# Patient Record
Sex: MALE | Race: WHITE | Employment: FULL TIME | RURAL
[De-identification: names, ages, dates, MRNs, and addresses within clinical notes are randomized per-mention and may not be internally consistent; named-entity substitution may affect disease eponyms.]

---

## 2017-01-07 ENCOUNTER — HOSPITAL ENCOUNTER (OUTPATIENT)
Dept: OTHER | Age: 47
Discharge: HOME | End: 2017-01-07
Attending: NURSE PRACTITIONER | Admitting: NURSE PRACTITIONER

## 2017-01-07 DIAGNOSIS — M54.5 LOW BACK PAIN, UNSPECIFIED BACK PAIN LATERALITY, UNSPECIFIED CHRONICITY, WITH SCIATICA PRESENCE UNSPECIFIED: ICD-10-CM

## 2017-01-24 ENCOUNTER — OFFICE VISIT (OUTPATIENT)
Dept: PRIMARY CARE CLINIC | Age: 47
End: 2017-01-24
Payer: COMMERCIAL

## 2017-01-24 VITALS
OXYGEN SATURATION: 98 % | WEIGHT: 230 LBS | RESPIRATION RATE: 20 BRPM | DIASTOLIC BLOOD PRESSURE: 86 MMHG | SYSTOLIC BLOOD PRESSURE: 130 MMHG | BODY MASS INDEX: 31.15 KG/M2 | HEART RATE: 90 BPM | HEIGHT: 72 IN

## 2017-01-24 DIAGNOSIS — M54.50 CHRONIC LEFT-SIDED LOW BACK PAIN WITHOUT SCIATICA: ICD-10-CM

## 2017-01-24 DIAGNOSIS — R10.32 LEFT GROIN PAIN: ICD-10-CM

## 2017-01-24 DIAGNOSIS — G89.29 CHRONIC LEFT-SIDED LOW BACK PAIN WITHOUT SCIATICA: ICD-10-CM

## 2017-01-24 PROCEDURE — 99202 OFFICE O/P NEW SF 15 MIN: CPT | Performed by: FAMILY MEDICINE

## 2017-01-24 RX ORDER — HYDROCODONE BITARTRATE AND ACETAMINOPHEN 7.5; 325 MG/1; MG/1
7.5-325 TABLET ORAL EVERY 6 HOURS PRN
Refills: 0 | COMMUNITY
Start: 2017-01-06 | End: 2017-01-24

## 2017-01-24 RX ORDER — TIZANIDINE 4 MG/1
4 TABLET ORAL NIGHTLY PRN
Refills: 0 | COMMUNITY
Start: 2017-01-06 | End: 2017-01-24

## 2017-01-24 RX ORDER — LOSARTAN POTASSIUM 100 MG/1
100 TABLET ORAL DAILY
Refills: 0 | COMMUNITY
Start: 2016-12-24

## 2017-01-24 RX ORDER — HYDROCHLOROTHIAZIDE 12.5 MG/1
12.5 TABLET ORAL DAILY
Refills: 0 | COMMUNITY
Start: 2017-01-16

## 2017-01-24 RX ORDER — METHOCARBAMOL 750 MG/1
750 TABLET, FILM COATED ORAL 3 TIMES DAILY
Qty: 30 TABLET | Refills: 0 | Status: SHIPPED | OUTPATIENT
Start: 2017-01-24 | End: 2017-01-31 | Stop reason: ALTCHOICE

## 2017-01-24 ASSESSMENT — ENCOUNTER SYMPTOMS
COUGH: 0
EYE REDNESS: 0
EYE DISCHARGE: 0
SHORTNESS OF BREATH: 0
RHINORRHEA: 0
ABDOMINAL PAIN: 0
NAUSEA: 0
SORE THROAT: 0
DIARRHEA: 0
CONSTIPATION: 0
VOMITING: 0
BACK PAIN: 1
EYE ITCHING: 0

## 2017-01-31 ENCOUNTER — OFFICE VISIT (OUTPATIENT)
Dept: PRIMARY CARE CLINIC | Age: 47
End: 2017-01-31
Payer: COMMERCIAL

## 2017-01-31 VITALS
RESPIRATION RATE: 20 BRPM | BODY MASS INDEX: 32.23 KG/M2 | SYSTOLIC BLOOD PRESSURE: 130 MMHG | WEIGHT: 238 LBS | HEART RATE: 85 BPM | OXYGEN SATURATION: 97 % | DIASTOLIC BLOOD PRESSURE: 84 MMHG | HEIGHT: 72 IN

## 2017-01-31 DIAGNOSIS — R10.32 LEFT GROIN PAIN: ICD-10-CM

## 2017-01-31 DIAGNOSIS — M54.50 CHRONIC LEFT-SIDED LOW BACK PAIN WITHOUT SCIATICA: ICD-10-CM

## 2017-01-31 DIAGNOSIS — G89.29 CHRONIC LEFT-SIDED LOW BACK PAIN WITHOUT SCIATICA: ICD-10-CM

## 2017-01-31 PROCEDURE — 99213 OFFICE O/P EST LOW 20 MIN: CPT | Performed by: FAMILY MEDICINE

## 2017-01-31 RX ORDER — MELOXICAM 7.5 MG/1
7.5 TABLET ORAL DAILY
Refills: 0 | COMMUNITY
Start: 2017-01-27 | End: 2017-01-31

## 2017-01-31 ASSESSMENT — ENCOUNTER SYMPTOMS
EYE REDNESS: 0
VOMITING: 0
NAUSEA: 0
RHINORRHEA: 0
DIARRHEA: 0
ABDOMINAL PAIN: 0
SORE THROAT: 0
BACK PAIN: 1
EYE ITCHING: 0
CONSTIPATION: 0
COUGH: 0
SHORTNESS OF BREATH: 0
EYE DISCHARGE: 0

## 2017-02-01 ENCOUNTER — HOSPITAL ENCOUNTER (OUTPATIENT)
Dept: PHYSICAL THERAPY | Age: 47
Discharge: HOME | End: 2017-02-28
Attending: ORTHOPAEDIC SURGERY | Admitting: ORTHOPAEDIC SURGERY

## 2017-02-01 ASSESSMENT — PAIN SCALES - GENERAL: PAINLEVEL_OUTOF10: 5

## 2017-02-01 ASSESSMENT — PAIN DESCRIPTION - PAIN TYPE: TYPE: ACUTE PAIN

## 2017-02-01 ASSESSMENT — PAIN DESCRIPTION - LOCATION: LOCATION: BACK

## 2017-02-01 ASSESSMENT — PAIN DESCRIPTION - FREQUENCY: FREQUENCY: CONTINUOUS

## 2017-02-01 ASSESSMENT — PAIN DESCRIPTION - DESCRIPTORS: DESCRIPTORS: ACHING;BURNING;DULL;DISCOMFORT

## 2017-02-06 ENCOUNTER — OFFICE VISIT (OUTPATIENT)
Dept: SURGERY | Facility: CLINIC | Age: 47
End: 2017-02-06

## 2017-02-06 ENCOUNTER — PREP FOR SURGERY (OUTPATIENT)
Dept: SURGERY | Facility: CLINIC | Age: 47
End: 2017-02-06

## 2017-02-06 VITALS
WEIGHT: 234 LBS | RESPIRATION RATE: 18 BRPM | DIASTOLIC BLOOD PRESSURE: 98 MMHG | BODY MASS INDEX: 31.69 KG/M2 | TEMPERATURE: 97.8 F | SYSTOLIC BLOOD PRESSURE: 128 MMHG | HEIGHT: 72 IN | HEART RATE: 76 BPM

## 2017-02-06 DIAGNOSIS — K40.90 UNILATERAL INGUINAL HERNIA WITHOUT OBSTRUCTION OR GANGRENE, RECURRENCE NOT SPECIFIED: Primary | ICD-10-CM

## 2017-02-06 DIAGNOSIS — K40.90 INGUINAL HERNIA OF LEFT SIDE WITHOUT OBSTRUCTION OR GANGRENE: Primary | ICD-10-CM

## 2017-02-06 PROCEDURE — 99204 OFFICE O/P NEW MOD 45 MIN: CPT | Performed by: SURGERY

## 2017-02-06 RX ORDER — LOSARTAN POTASSIUM 100 MG/1
TABLET ORAL
COMMUNITY
Start: 2016-12-24 | End: 2022-09-23 | Stop reason: SDUPTHER

## 2017-02-06 RX ORDER — METHOCARBAMOL 750 MG/1
TABLET, FILM COATED ORAL
Refills: 0 | COMMUNITY
Start: 2017-01-24 | End: 2020-11-18

## 2017-02-06 RX ORDER — HYDROCHLOROTHIAZIDE 12.5 MG/1
TABLET ORAL
COMMUNITY
Start: 2017-01-16 | End: 2020-11-18 | Stop reason: DRUGHIGH

## 2017-02-06 RX ORDER — CYCLOBENZAPRINE HCL 10 MG
TABLET ORAL
Refills: 0 | COMMUNITY
Start: 2016-12-23 | End: 2020-11-18 | Stop reason: DRUGHIGH

## 2017-02-06 NOTE — PROGRESS NOTES
Reason for Consultation: Inguinal pain / mass      Chief complaint   Chief Complaint   Patient presents with   • Left inguinal area pain     Pt complains of left inguinal area pain which began @ 3 months ago while lifting heavey object at work.       Subjective .     History of present illness:  I saw the patient in the office today as a consultation for evaluation and treatment of left inguinal pain for 3 months duration. Pain severe and worse with prolonged standing. No change in bowel habits and no urinary symptoms. No previous hernia repair and injury at work started the pain 3 months ago. No bulge noted.No rectal bleeding.    Review of Systems   Constitutional: Positive for activity change and fatigue.   Respiratory: Negative for chest tightness and shortness of breath.    Cardiovascular: Negative for chest pain and leg swelling.   Gastrointestinal: Negative for abdominal distention, abdominal pain, anal bleeding, blood in stool and rectal pain.   Genitourinary: Positive for testicular pain.   Musculoskeletal: Negative for gait problem and neck pain.   Skin: Negative for pallor.   Allergic/Immunologic: Negative for environmental allergies and food allergies.   Neurological: Negative for syncope, weakness and headaches.   Psychiatric/Behavioral: Negative for sleep disturbance. The patient is not nervous/anxious.        History  Past Medical History   Diagnosis Date   • Hypertension        Past Surgical History   Procedure Laterality Date   • Hernia repair Left        Family History   Problem Relation Age of Onset   • Cancer Maternal Grandmother    • Cancer Maternal Grandfather        Social History   Substance Use Topics   • Smoking status: Never Smoker   • Smokeless tobacco: None   • Alcohol use Yes      Comment: occasionally       Review of patient's allergies indicates no known allergies.    Objective     Vital Signs   Temp:  [97.8 °F (36.6 °C)] 97.8 °F (36.6 °C)  Heart Rate:  [76] 76  Resp:  [18] 18  BP:  (128)/(98) 128/98    Physical Exam:     General Appearance:    Alert, cooperative, in no acute distress   Head:    Normocephalic, without obvious abnormality, atraumatic   Eyes:            Lids and lashes normal, conjunctivae and sclerae normal, no   icterus, no pallor, corneas clear, PERRLA   Ears:    Ears appear intact with no abnormalities noted   Throat:   No oral lesions, no thrush, oral mucosa moist   Neck:   No adenopathy, supple, trachea midline, no thyromegaly, no   carotid bruit, no JVD   Back:     No kyphosis present, no scoliosis present, no skin lesions,      erythema or scars, no tenderness to percussion or                   palpation,   range of motion normal   Lungs:     Clear to auscultation,respirations regular, even and                  unlabored    Heart:    Regular rhythm and normal rate, normal S1 and S2, no            murmur   Abdomen:     no masses, no organomegaly, soft non-tender, non-distended, no guarding, there is evidence of a left groin pain and inflammatory response. No bulge.   Extremities:   Moves all extremities well, no edema, no cyanosis, no             redness   Pulses:   Pulses palpable and equal bilaterally   Skin:   No bleeding, bruising or rash   Lymph nodes:   No palpable adenopathy   Neurologic:   Cranial nerves 2 - 12 grossly intact, sensation intact        Results Review:   I reviewed the patient's new clinical results.      Assessment/Plan : Left inguinal sports hernia of 3 months duration. Symptoms worsening and work related. Will schedule LIH repair with mesh. Knows that pain may not resolve and may take 2 to 4 weeks to improve.      I had a detailed and extensive discussion with the patient in the office and they understand that they need to undergo inguinal hernia repair with mesh.  Full risks and benefits of operative versus nonoperative intervention were discussed with the patient and these included things such as nonresolution of symptoms and possible worsening  of symptoms without surgical intervention versus infection, bleeding, possible recurrent hernia, possible postoperative neuralgia from nerve damage or involvement with scar tissue, etc.  The patient understands, agrees, and had no questions for me at the end of the office visit.     I discussed the patients findings and my recommendations with patient and consulting provider.        Redd Bowles MD  02/06/17  2:13 PM

## 2017-02-07 ENCOUNTER — HOSPITAL ENCOUNTER (OUTPATIENT)
Dept: PHYSICAL THERAPY | Age: 47
Discharge: HOME | End: 2017-02-07

## 2017-02-09 ENCOUNTER — HOSPITAL ENCOUNTER (OUTPATIENT)
Dept: PHYSICAL THERAPY | Age: 47
Discharge: HOME | End: 2017-02-09

## 2017-02-13 ENCOUNTER — HOSPITAL ENCOUNTER (OUTPATIENT)
Dept: PHYSICAL THERAPY | Age: 47
Discharge: HOME | End: 2017-02-13

## 2017-02-15 ENCOUNTER — HOSPITAL ENCOUNTER (OUTPATIENT)
Dept: PHYSICAL THERAPY | Age: 47
Discharge: HOME | End: 2017-02-15

## 2017-02-20 ENCOUNTER — TELEPHONE (OUTPATIENT)
Dept: SURGERY | Facility: CLINIC | Age: 47
End: 2017-02-20

## 2017-02-23 ENCOUNTER — HOSPITAL ENCOUNTER (OUTPATIENT)
Dept: PHYSICAL THERAPY | Age: 47
Discharge: HOME | End: 2017-02-23

## 2017-02-28 ENCOUNTER — OFFICE VISIT (OUTPATIENT)
Dept: PRIMARY CARE CLINIC | Age: 47
End: 2017-02-28
Payer: COMMERCIAL

## 2017-02-28 ENCOUNTER — HOSPITAL ENCOUNTER (OUTPATIENT)
Dept: PHYSICAL THERAPY | Age: 47
Discharge: HOME | End: 2017-02-28

## 2017-02-28 VITALS
OXYGEN SATURATION: 98 % | SYSTOLIC BLOOD PRESSURE: 140 MMHG | BODY MASS INDEX: 31.69 KG/M2 | RESPIRATION RATE: 18 BRPM | WEIGHT: 234 LBS | HEIGHT: 72 IN | DIASTOLIC BLOOD PRESSURE: 100 MMHG | HEART RATE: 97 BPM

## 2017-02-28 DIAGNOSIS — M54.50 CHRONIC LEFT-SIDED LOW BACK PAIN WITHOUT SCIATICA: Primary | ICD-10-CM

## 2017-02-28 DIAGNOSIS — R10.32 LEFT GROIN PAIN: ICD-10-CM

## 2017-02-28 DIAGNOSIS — G89.29 CHRONIC LEFT-SIDED LOW BACK PAIN WITHOUT SCIATICA: Primary | ICD-10-CM

## 2017-02-28 PROCEDURE — 99213 OFFICE O/P EST LOW 20 MIN: CPT | Performed by: FAMILY MEDICINE

## 2017-02-28 ASSESSMENT — ENCOUNTER SYMPTOMS
NAUSEA: 0
EYE ITCHING: 0
EYE DISCHARGE: 0
CONSTIPATION: 0
EYE REDNESS: 0
VOMITING: 0
SHORTNESS OF BREATH: 0
COUGH: 0
SORE THROAT: 0
ABDOMINAL PAIN: 0
RHINORRHEA: 0
DIARRHEA: 0

## 2017-03-01 ASSESSMENT — ENCOUNTER SYMPTOMS: BACK PAIN: 1

## 2017-03-02 ENCOUNTER — HOSPITAL ENCOUNTER (OUTPATIENT)
Dept: PHYSICAL THERAPY | Age: 47
Discharge: HOME | End: 2017-03-02

## 2017-03-21 ENCOUNTER — OUTSIDE FACILITY SERVICE (OUTPATIENT)
Dept: SURGERY | Facility: CLINIC | Age: 47
End: 2017-03-21

## 2017-03-21 PROCEDURE — 49520 REREPAIR ING HERNIA REDUCE: CPT | Performed by: SURGERY

## 2017-03-28 ENCOUNTER — OFFICE VISIT (OUTPATIENT)
Dept: PRIMARY CARE CLINIC | Age: 47
End: 2017-03-28
Payer: COMMERCIAL

## 2017-03-28 VITALS
HEART RATE: 68 BPM | WEIGHT: 226 LBS | RESPIRATION RATE: 20 BRPM | BODY MASS INDEX: 30.61 KG/M2 | HEIGHT: 72 IN | DIASTOLIC BLOOD PRESSURE: 80 MMHG | SYSTOLIC BLOOD PRESSURE: 124 MMHG | OXYGEN SATURATION: 98 %

## 2017-03-28 DIAGNOSIS — G89.29 CHRONIC LEFT-SIDED LOW BACK PAIN WITHOUT SCIATICA: Primary | ICD-10-CM

## 2017-03-28 DIAGNOSIS — R10.32 LEFT GROIN PAIN: ICD-10-CM

## 2017-03-28 DIAGNOSIS — M54.50 CHRONIC LEFT-SIDED LOW BACK PAIN WITHOUT SCIATICA: Primary | ICD-10-CM

## 2017-03-28 PROCEDURE — 99213 OFFICE O/P EST LOW 20 MIN: CPT | Performed by: FAMILY MEDICINE

## 2017-03-28 ASSESSMENT — ENCOUNTER SYMPTOMS
EYE ITCHING: 0
RHINORRHEA: 0
CONSTIPATION: 0
DIARRHEA: 0
NAUSEA: 0
SORE THROAT: 0
SHORTNESS OF BREATH: 0
VOMITING: 0
EYE DISCHARGE: 0
COUGH: 0
ABDOMINAL PAIN: 0
EYE REDNESS: 0

## 2017-03-29 ASSESSMENT — ENCOUNTER SYMPTOMS: BACK PAIN: 1

## 2017-04-06 ENCOUNTER — OFFICE VISIT (OUTPATIENT)
Dept: SURGERY | Facility: CLINIC | Age: 47
End: 2017-04-06

## 2017-04-06 VITALS — DIASTOLIC BLOOD PRESSURE: 107 MMHG | SYSTOLIC BLOOD PRESSURE: 149 MMHG | TEMPERATURE: 98.3 F | HEART RATE: 83 BPM

## 2017-04-06 DIAGNOSIS — Z48.89 POSTOPERATIVE VISIT: Primary | ICD-10-CM

## 2017-04-06 PROCEDURE — 99024 POSTOP FOLLOW-UP VISIT: CPT | Performed by: SURGERY

## 2017-04-06 NOTE — PROGRESS NOTES
Reason for Consultation: Follow-up hernia    Chief Complaint:   Chief Complaint   Patient presents with   • Post-op     PO Lt inguinal hernia. Complains of pain and tenderness.        History of present illness:  I saw the patient in the office today as a followup from their recent hernia repair.  They state that they have done well and are having no complaints.      Vital Signs:   Temp:  [98.3 °F (36.8 °C)] 98.3 °F (36.8 °C)  Heart Rate:  [83] 83  BP: (149)/(107) 149/107    Physical Exam:   General Appearance:    Alert, cooperative, in no acute distress   Abdomen:     no masses, no organomegaly, soft non-tender, non-distended, no guarding, wounds are well healed, no evidence of recurrent hernia     Assessment / Plan:    1. Postoperative visit      RTW 3 weeks  I did discuss the situation with the patient today in the office and they have done well from their recent herniorraphy.  I have released the patient back to normal activity, they understand that they need to be careful about heavy lifting.  I need to see the patient back in the office only if they are having further problems, they know to call me if they are.    Redd Bowles MD  04/06/17

## 2017-04-27 ENCOUNTER — OFFICE VISIT (OUTPATIENT)
Dept: SURGERY | Facility: CLINIC | Age: 47
End: 2017-04-27

## 2017-04-27 VITALS
TEMPERATURE: 98 F | SYSTOLIC BLOOD PRESSURE: 148 MMHG | WEIGHT: 233.6 LBS | DIASTOLIC BLOOD PRESSURE: 96 MMHG | HEART RATE: 95 BPM | BODY MASS INDEX: 31.64 KG/M2 | HEIGHT: 72 IN | RESPIRATION RATE: 16 BRPM

## 2017-04-27 DIAGNOSIS — Z48.89 POSTOPERATIVE VISIT: Primary | ICD-10-CM

## 2017-04-27 PROCEDURE — 99024 POSTOP FOLLOW-UP VISIT: CPT | Performed by: SURGERY

## 2017-04-27 NOTE — PROGRESS NOTES
Patient: Cristian Desai    YOB: 1970    Date: 04/27/2017    Primary Care Provider: Rossy Armando DO    Reason for Consultation: Follow-up hernia    Chief Complaint:   Chief Complaint   Patient presents with   • Post-op Follow-up     left inguinal hernia repair done 03/21/2017       History of present illness:  I saw the patient in the office today as a followup from their recent hernia repair.  They state that they have done well and are having no complaints.      Vital Signs:   Temp:  [98 °F (36.7 °C)] 98 °F (36.7 °C)  Heart Rate:  [95] 95  Resp:  [16] 16  BP: (148)/(96) 148/96    Physical Exam:   General Appearance:    Alert, cooperative, in no acute distress   Abdomen:     no masses, no organomegaly, soft non-tender, non-distended, no guarding, wounds are well healed, no evidence of recurrent hernia     Assessment / Plan:    1. Postoperative visit        I did discuss the situation with the patient today in the office and they have done well from their recent herniorraphy.  I have released the patient back to normal activity, they understand that they need to be careful about heavy lifting.  I need to see the patient back in the office only if they are having further problems, they know to call me if they are.    Redd Bowles MD  04/27/17

## 2017-04-28 ENCOUNTER — OFFICE VISIT (OUTPATIENT)
Dept: PRIMARY CARE CLINIC | Age: 47
End: 2017-04-28
Payer: COMMERCIAL

## 2017-04-28 VITALS
HEART RATE: 69 BPM | BODY MASS INDEX: 31.42 KG/M2 | WEIGHT: 232 LBS | DIASTOLIC BLOOD PRESSURE: 78 MMHG | SYSTOLIC BLOOD PRESSURE: 122 MMHG | OXYGEN SATURATION: 97 % | HEIGHT: 72 IN | RESPIRATION RATE: 20 BRPM

## 2017-04-28 DIAGNOSIS — R10.32 LEFT GROIN PAIN: ICD-10-CM

## 2017-04-28 DIAGNOSIS — M54.50 CHRONIC LEFT-SIDED LOW BACK PAIN WITHOUT SCIATICA: Primary | ICD-10-CM

## 2017-04-28 DIAGNOSIS — G89.29 CHRONIC LEFT-SIDED LOW BACK PAIN WITHOUT SCIATICA: Primary | ICD-10-CM

## 2017-04-28 PROCEDURE — 99213 OFFICE O/P EST LOW 20 MIN: CPT | Performed by: FAMILY MEDICINE

## 2017-04-28 RX ORDER — DICLOFENAC SODIUM 75 MG/1
75 TABLET, DELAYED RELEASE ORAL 2 TIMES DAILY
Refills: 0 | COMMUNITY
Start: 2017-01-30

## 2017-04-28 RX ORDER — METHOCARBAMOL 750 MG/1
750 TABLET, FILM COATED ORAL
Refills: 0 | COMMUNITY
Start: 2017-04-20

## 2017-04-28 ASSESSMENT — ENCOUNTER SYMPTOMS
NAUSEA: 0
SHORTNESS OF BREATH: 0
EYE ITCHING: 0
SORE THROAT: 0
VOMITING: 0
RHINORRHEA: 0
COUGH: 0
BACK PAIN: 1
CONSTIPATION: 0
ABDOMINAL PAIN: 0
DIARRHEA: 0
EYE DISCHARGE: 0
EYE REDNESS: 0

## 2017-05-26 ENCOUNTER — OFFICE VISIT (OUTPATIENT)
Dept: PRIMARY CARE CLINIC | Age: 47
End: 2017-05-26
Payer: COMMERCIAL

## 2017-05-26 VITALS
WEIGHT: 231 LBS | DIASTOLIC BLOOD PRESSURE: 80 MMHG | OXYGEN SATURATION: 98 % | HEIGHT: 72 IN | BODY MASS INDEX: 31.29 KG/M2 | HEART RATE: 94 BPM | RESPIRATION RATE: 20 BRPM | SYSTOLIC BLOOD PRESSURE: 128 MMHG

## 2017-05-26 DIAGNOSIS — G89.29 CHRONIC LEFT-SIDED LOW BACK PAIN WITHOUT SCIATICA: Primary | ICD-10-CM

## 2017-05-26 DIAGNOSIS — M54.50 CHRONIC LEFT-SIDED LOW BACK PAIN WITHOUT SCIATICA: Primary | ICD-10-CM

## 2017-05-26 PROBLEM — R10.32 LEFT GROIN PAIN: Status: RESOLVED | Noted: 2017-01-31 | Resolved: 2017-05-26

## 2017-05-26 PROCEDURE — 99213 OFFICE O/P EST LOW 20 MIN: CPT | Performed by: FAMILY MEDICINE

## 2017-05-26 ASSESSMENT — ENCOUNTER SYMPTOMS
NAUSEA: 0
DIARRHEA: 0
COUGH: 0
EYE ITCHING: 0
RHINORRHEA: 0
SORE THROAT: 0
BACK PAIN: 1
ABDOMINAL PAIN: 0
SHORTNESS OF BREATH: 0
EYE REDNESS: 0
VOMITING: 0
EYE DISCHARGE: 0
CONSTIPATION: 0

## 2017-05-26 ASSESSMENT — PATIENT HEALTH QUESTIONNAIRE - PHQ9
SUM OF ALL RESPONSES TO PHQ QUESTIONS 1-9: 0
2. FEELING DOWN, DEPRESSED OR HOPELESS: 0
SUM OF ALL RESPONSES TO PHQ9 QUESTIONS 1 & 2: 0
1. LITTLE INTEREST OR PLEASURE IN DOING THINGS: 0

## 2017-11-27 ENCOUNTER — HOSPITAL ENCOUNTER (OUTPATIENT)
Dept: PHYSICAL THERAPY | Age: 47
Discharge: HOME | End: 2017-11-30
Attending: ORTHOPAEDIC SURGERY | Admitting: ORTHOPAEDIC SURGERY

## 2017-11-27 ASSESSMENT — PAIN DESCRIPTION - LOCATION: LOCATION: BACK

## 2017-11-27 ASSESSMENT — PAIN DESCRIPTION - FREQUENCY: FREQUENCY: CONTINUOUS

## 2017-11-27 ASSESSMENT — PAIN SCALES - GENERAL: PAINLEVEL_OUTOF10: 6

## 2017-11-27 ASSESSMENT — PAIN DESCRIPTION - ONSET: ONSET: ON-GOING

## 2017-11-27 NOTE — PROGRESS NOTES
technician  Leisure & Hobbies: hunt, fish  Objective     Observation/Palpation  Posture: Fair  Palpation: general tenderness acacia-incision lumbar spine, increased muscle tension lumbar paraspinals  Observation: gait = WNL; lumbar incision = well healed, no erythema or edema    AROM RLE (degrees)  RLE AROM: WFL  AROM LLE (degrees)  LLE AROM : WFL  Spine  Lumbar: AROM: FF = 45, BB = 15, SB = 25, ROT = 30  Special Tests: (+) HS tightness, (-) slump test    Strength RLE  Strength RLE: WFL  Strength LLE  Strength LLE: WFL  Tone RLE  RLE Tone: Normotonic  Tone LLE  LLE Tone: Normotonic  Motor Control  Gross Motor?: WFL  Additional Measures  Other: LEFS  =41/80 ; Back Index = 50. Sensation  Overall Sensation Status: [facility]        Ambulation  Ambulation?: Yes  Ambulation 1  Surface: level tile  Device: No Device  Assistance: Independent     Exercises  Exercise 1: LTR x 2'  Exercise 2: hip isometrics: abd, add:  5\" x 20  Exercise 3: SKTC, 15\" x 5 bilateral  Exercise 4: Supine active HS stretch, with active ankle DF 10\" x 10  Exercise 6: begin Nustep  Exercise 7: mini squats  Exercise 8: standing marching                      Assessment   Conditions Requiring Skilled Therapeutic Intervention  Body structures, Functions, Activity limitations: Decreased functional mobility ; Decreased ADL status; Decreased ROM; Decreased high-level IADLs  Assessment: s/p lumbar laminectomy; LBP, left leg pain  Treatment Diagnosis: s/p lumbar laminectomy; LBP, left leg pain  Prognosis: Good  Decision Making: Low Complexity  REQUIRES PT FOLLOW UP: Yes  Treatment Initiated : exercise program  Activity Tolerance  Activity Tolerance: Patient Tolerated treatment well         Plan   Plan  Times per week: 2-3 x week  Plan weeks: 6-8 weeks  Current Treatment Recommendations: Strengthening, ROM, Home Exercise Program, Manual Therapy - Joint Manipulation, Manual Therapy - Soft Tissue Mobilization, Neuromuscular Re-education, Modalities Goals  Short term goals  Time Frame for Short term goals: 3-4 weeks  Short term goal 1: Achieve LBP at 3/10 with ADL's and I-ADL's. Short term goal 2: Increase lumbar AROM by 5-10 degrees in the noted deficits. Short term goal 3: Be independent with HEP. Short term goal 4: Achieve a 25% improvement with LEFS and Back Index scores. Long term goals  Time Frame for Long term goals : 6-8 weeks  Long term goal 1: Achieve LBP at 1/10 with ADL's and I-ADL's. Long term goal 2: Achieve a 50-75% improvement with LEFS and Back Index scores. Long term goal 3: Return to work to at least modified duty. Therapy Time   Individual Concurrent Group Co-treatment   Time In           Time Out           Minutes                   Electronically signed by Katie Poole PT on 11/27/2017 at 51:62 AM      Certification of Medical Necessity: It will be understood that this treatment plan is certified medically necessary by the documenting therapist and referring physician mentioned in this report. Unless the physician indicated otherwise through written correspondence with our office, all further referrals will act as certification of medical necessity on this treatment plan. Thank you for this referral.  If you have questions regarding this plan of care, please call [phone].           Revisions to this plan (optional):                     Please sign and return this plan to:   FAX: [phone]      Signature:                                 Date:

## 2017-12-01 ENCOUNTER — HOSPITAL ENCOUNTER (OUTPATIENT)
Dept: OTHER | Age: 47
Discharge: HOME | End: 2017-12-31
Attending: ORTHOPAEDIC SURGERY | Admitting: ORTHOPAEDIC SURGERY

## 2017-12-01 ENCOUNTER — HOSPITAL ENCOUNTER (OUTPATIENT)
Dept: PHYSICAL THERAPY | Age: 47
Discharge: HOME | End: 2017-12-01

## 2017-12-05 ENCOUNTER — HOSPITAL ENCOUNTER (OUTPATIENT)
Dept: PHYSICAL THERAPY | Age: 47
Discharge: HOME | End: 2017-12-05

## 2017-12-05 NOTE — FLOWSHEET NOTE
Physical Therapy Daily Treatment Note   Date:  2017    TIme In:      8340                Time Out:   1008    Patient Name:  Jeaneth Riley    :  1970  MRN: [id number]    Restrictions/Precautions:    Pertinent Medical History:  Medical/Treatment Diagnosis Information:  ·   s/p lumbar laminectomy; LBP, left leg pain     Insurance/Certification information:    [employer] worker's compensation  Physician Information:   Carlos Manuel Reynaga MD  Plan of care signed (Y/N):    Visit# / total visits:    3 /    G-Code (if applicable):      Date / Visit # G-Code Applied:         Progress Note: []  Yes  [x]  No  Next due by: Visit #10      Pain level:   5/10    Subjective:  Feels stiff this morning; no significant changes or new c/o.      Objective:  Observation:   Test measurements:    Palpation:    Exercises:  Exercise Resistance/Repetitions Other comments   Nustep L5x8' 5   Mini squats 2x10 5   Standing marches 1'x2 5   LTR 2' 5   SKTC 15\"x5 5   Hip isometrics: add,abd 5\"x20 5   Supine active HS stretch, with active ankle DF  10\"x10 5                              Other Therapeutic Activities:      Manual Treatments:  STM lumbar paraspinal x 15'    Modalities:  MH with IFC e-stim - lumbar paraspinals x 15' to finish treatment      Timed Code Treatment Minutes:   72  '      Total Treatment Minutes:    68' '    Treatment/Activity Tolerance:  [x] Patient tolerated treatment well [] Patient limited by fatigue  [] Patient limited by pain  [] Patient limited by other medical complications  [] Other:     Pain after treatment:    5 /10    Prognosis: [x] Good [] Fair  [] Poor    Patient Requires Follow-up: [x] Yes  [] No    Plan:   [x] Continue per plan of care [] Alter current plan (see comments)  [] Plan of care initiated [] Hold pending MD visit [] Discharge    Plan for Next Session:        Electronically signed by:   Electronically signed by Sue Trujillo PT on 2017 at 1:25 PM

## 2017-12-07 ENCOUNTER — HOSPITAL ENCOUNTER (OUTPATIENT)
Dept: PHYSICAL THERAPY | Age: 47
Discharge: HOME | End: 2017-12-07

## 2017-12-07 NOTE — FLOWSHEET NOTE
Physical Therapy Daily Treatment Note   Date:  2017    TIme In:      929              Time Out:  [address]    Patient Name:  Tao Speaker    :  1970  MRN: [id number]    Restrictions/Precautions:    Pertinent Medical History:  Medical/Treatment Diagnosis Information:  ·   s/p lumbar laminectomy; LBP, left leg pain     Insurance/Certification information:    [employer]  worker's compensation  Physician Information:   David Pacheco MD  Plan of care signed (Y/N):    Visit# / total visits:    4 /    G-Code (if applicable):      Date / Visit # G-Code Applied:         Progress Note: []  Yes  [x]  No  Next due by: Visit #10      Pain level:   5/10    Subjective:  Feels stiff this morning; no significant changes or new c/o.      Objective:  Observation:   Test measurements:    Palpation:    Exercises:  Exercise Resistance/Repetitions Other comments   Nustep L5x8' 7   Mini squats 2x10 7   Standing marches 1'x2 7   LTR 2' 7   SKTC 15\"x5 7   Hip isometrics: add,abd 5\"x20 7   Supine active HS stretch, with active ankle DF  10\"x10 7                              Other Therapeutic Activities:      Manual Treatments:  STM lumbar paraspinal x 15'    Modalities:  MH with IFC e-stim - lumbar paraspinals x 15' to finish treatment      Timed Code Treatment Minutes:    54 '      Total Treatment Minutes:    61 '    Treatment/Activity Tolerance:  [x] Patient tolerated treatment well [] Patient limited by fatigue  [] Patient limited by pain  [] Patient limited by other medical complications  [] Other:     Pain after treatment:    4 /10    Prognosis: [x] Good [] Fair  [] Poor    Patient Requires Follow-up: [x] Yes  [] No    Plan:   [x] Continue per plan of care [] Alter current plan (see comments)  [] Plan of care initiated [] Hold pending MD visit [] Discharge    Plan for Next Session:        Electronically signed by:   Electronically signed by Marizol Mendez PT on 2017 at 10:47 AM

## 2017-12-12 ENCOUNTER — HOSPITAL ENCOUNTER (OUTPATIENT)
Dept: PHYSICAL THERAPY | Age: 47
Discharge: HOME | End: 2017-12-12

## 2017-12-14 ENCOUNTER — HOSPITAL ENCOUNTER (OUTPATIENT)
Dept: PHYSICAL THERAPY | Age: 47
Discharge: HOME | End: 2017-12-14

## 2017-12-14 NOTE — FLOWSHEET NOTE
Session:        Electronically signed by:    Electronically signed by Brian Cancino PTA on 12/14/2017 at 9:02 AM

## 2017-12-19 ENCOUNTER — HOSPITAL ENCOUNTER (OUTPATIENT)
Dept: PHYSICAL THERAPY | Age: 47
Discharge: HOME | End: 2017-12-19

## 2017-12-19 NOTE — FLOWSHEET NOTE
Physical Therapy Daily Treatment Note   Date:  2017    TIme In:    0211               Time Out:   4161    Patient Name:  Josi Sellers    :  1970  MRN: [id number]    Restrictions/Precautions:    Pertinent Medical History:   Medical/Treatment Diagnosis Information:  ·   s/p lumbar laminectomy; LBP, left leg pain      Insurance/Certification information:    [employer] worker's compensation  Physician Information:   Maria Luz Cortez MD  Plan of care signed (Y/N):    Visit# / total visits:    7/    G-Code (if applicable):      Date / Visit # G-Code Applied:         Progress Note: []  Yes  [x]  No  Next due by: Visit #10      Pain level: 5/10    Subjective:  Pt reports his pain stays about the same but PT seems to help. Objective:  Observation:   Test measurements:     Palpation:    Exercises:  Exercise Resistance/Repetitions Other comments   Nustep L5x8' 19   Mini squats 2x10 19   Standing marches 1'x2 19   LTR 4' 19   SKTC 15\"x5 19   Hip isometrics: add,abd 5\"x20 19   Supine active HS stretch, with active ankle DF  10\"x10 19                              Other Therapeutic Activities:      Manual Treatments:  STM lumbar paraspinal x 15'    Modalities:  MH with IFC e-stim - lumbar paraspinals x 15' to finish treatment      Timed Code Treatment Minutes:    50    Total Treatment Minutes:    70    Treatment/Activity Tolerance:  [x] Patient tolerated treatment well [] Patient limited by fatigue  [] Patient limited by pain  [] Patient limited by other medical complications  [x] Other:  Pt completed tx with slight decrease in pain and responded well to manual tx.     Pain after treatment:     4/10    Prognosis: [x] Good [] Fair  [] Poor    Patient Requires Follow-up: [x] Yes  [] No    Plan:   [x] Continue per plan of care [] Alter current plan (see comments)  [] Plan of care initiated [] Hold pending MD visit [] Discharge    Plan for Next Session:        Electronically signed by:    Electronically signed by Amna Sanders, PTA on 12/19/2017 at 9:26 AM

## 2017-12-21 ENCOUNTER — HOSPITAL ENCOUNTER (OUTPATIENT)
Dept: PHYSICAL THERAPY | Age: 47
Discharge: HOME | End: 2017-12-21

## 2017-12-21 NOTE — FLOWSHEET NOTE
Physical Therapy Reassessment Note   Date:  2017    TIme In:    0940               Time Out:     3281    Patient Name:  Josi Sellers    :  1970  MRN: [id number]    Restrictions/Precautions:    Pertinent Medical History:   Medical/Treatment Diagnosis Information:  ·   s/p lumbar laminectomy; LBP, left leg pain      Insurance/Certification information:    [employer] worker's compensation  Physician Information:   Maria Luz Cortez MD  Plan of care signed (Y/N):    Visit# / total visits:    8/    G-Code (if applicable):      Date / Visit # G-Code Applied:         Progress Note: []  Yes  [x]  No  Next due by: Visit #10      Pain level: 4/10    Subjective:  Pt reports that he was sore after last visit but then felt better. Objective:  Observation: Gait = WNL  Test measurements:     LEFS = 45 ; Back Index = 48. Palpation: Tenderness lumbar paraspinals, left > right    Exercises:  Exercise Resistance/Repetitions Other comments   Nustep L5x8' 21   Mini squats 2x10 21   Standing marches 1'x2 21   LTR 2' 21   [address] 15\"x5 21   Hip isometrics: add,abd 5\"x20 21   Supine active HS stretch, with active ankle DF  10\"x10 21                              Other Therapeutic Activities:      Manual Treatments:  STM lumbar paraspinal x 15'    Modalities:   with IFC e-stim - lumbar paraspinals x 15' to finish treatment      Timed Code Treatment Minutes:    40    Total Treatment Minutes:    55    Treatment/Activity Tolerance:  [x] Patient tolerated treatment well [] Patient limited by fatigue  [] Patient limited by pain  [] Patient limited by other medical complications  [x] Other:  Pt completed tx with slight decrease in pain and responded well to manual tx. Pain after treatment:     3/10    Prognosis: [x] Good [] Fair  [] Poor    Goals  Short term goals  Time Frame for Short term goals: 3-4 weeks  Short term goal 1: Achieve LBP at 3/10 with ADL's and I-ADL's. - not met - no change  Short term goal 2:  Increase

## 2017-12-28 ENCOUNTER — HOSPITAL ENCOUNTER (OUTPATIENT)
Dept: PHYSICAL THERAPY | Age: 47
Discharge: HOME | End: 2017-12-28

## 2017-12-28 NOTE — FLOWSHEET NOTE
Physical Therapy Daily Treatment Note   Date:  2017    TIme In:    0900               Time Out:     1010    Patient Name:  Graeme Waddell    :  1970  MRN: [id number]    Restrictions/Precautions:    Pertinent Medical History:   Medical/Treatment Diagnosis Information:  ·   s/p lumbar laminectomy; LBP, left leg pain      Insurance/Certification information:    [employer] worker's compensation  Physician Information:   Kelly Camp MD  Plan of care signed (Y/N):    Visit# / total visits:    9/    G-Code (if applicable):      Date / Visit # G-Code Applied:         Progress Note: []  Yes  [x]  No  Next due by: Visit #10      Pain level: 5/10    Subjective:  Pt reports he saw his MD yesterday and he wanted him to continue PT. Objective:  Observation:   Test measurements:     Palpation:    Exercises:  Exercise Resistance/Repetitions Other comments   Nustep L5x8' 28   Mini squats 2x10 28   Standing marches 1'x2 28   LTR 2' 28   SKTC 15\"x5 28   Hip isometrics: add,abd 5\"x20 28   Supine active HS stretch, with active ankle DF  10\"x10 28                              Other Therapeutic Activities:      Manual Treatments:  STM lumbar paraspinal x 15'    Modalities:  MH with IFC e-stim - lumbar paraspinals x 15' to finish treatment      Timed Code Treatment Minutes:    50    Total Treatment Minutes:    70    Treatment/Activity Tolerance:  [x] Patient tolerated treatment well [] Patient limited by fatigue  [] Patient limited by pain  [] Patient limited by other medical complications  [x] Other:  Pt completed tx with slight decrease in pain and responded well to manual tx.     Pain after treatment:     4/10    Prognosis: [x] Good [] Fair  [] Poor    Patient Requires Follow-up: [x] Yes  [] No    Plan:   [x] Continue per plan of care [] Alter current plan (see comments)  [] Plan of care initiated [] Hold pending MD visit [] Discharge    Plan for Next Session:        Electronically signed by:    Electronically

## 2018-01-01 ENCOUNTER — HOSPITAL ENCOUNTER (OUTPATIENT)
Dept: OTHER | Age: 48
Discharge: HOME | End: 2018-01-31
Attending: ORTHOPAEDIC SURGERY | Admitting: ORTHOPAEDIC SURGERY

## 2018-01-03 ENCOUNTER — HOSPITAL ENCOUNTER (OUTPATIENT)
Dept: PHYSICAL THERAPY | Age: 48
Discharge: HOME | End: 2018-01-03

## 2018-01-05 ENCOUNTER — HOSPITAL ENCOUNTER (OUTPATIENT)
Dept: PHYSICAL THERAPY | Age: 48
Discharge: HOME | End: 2018-01-05

## 2018-01-08 ENCOUNTER — HOSPITAL ENCOUNTER (OUTPATIENT)
Dept: PHYSICAL THERAPY | Age: 48
Discharge: HOME | End: 2018-01-08

## 2018-01-10 ENCOUNTER — HOSPITAL ENCOUNTER (OUTPATIENT)
Dept: PHYSICAL THERAPY | Age: 48
Discharge: HOME | End: 2018-01-10

## 2018-01-10 NOTE — FLOWSHEET NOTE
Physical Therapy Daily Treatment Note   Date:  1/10/2018    TIme In:    0787               Time Out:     1030    Patient Name:  Devyn Macias    :  1970  MRN: [id number]    Restrictions/Precautions:    Pertinent Medical History:   Medical/Treatment Diagnosis Information:  ·   s/p lumbar laminectomy; LBP, left leg pain      Insurance/Certification information:    [employer]y Sinclair worker's compensation  Physician Information:   Harjeet Myers MD  Plan of care signed (Y/N):    Visit# / total visits:    13/    G-Code (if applicable):      Date / Visit # G-Code Applied:         Progress Note: []  Yes  [x]  No  Next due by: Visit #10      Pain level: 4/10    Subjective:  Pt with no new complaints. Objective:  Observation:   Test measurements:     Palpation:    Exercises:  Exercise Resistance/Repetitions Other comments   Nustep L5 x 10' 10   Mini squats 2x15 10   Standing marches 1'x2 10   LTR 4' 10   SKTC 15\"x5 10   Hip isometrics: add,abd 5\"x20 10   Supine active HS stretch, with active ankle DF  10\"x10 10   Ab bracing 5\"x20 10   PPT 5\"x20 10                    Other Therapeutic Activities:      Manual Treatments:  STM lumbar paraspinal     Modalities:   with IFC e-stim - lumbar paraspinals x 15' to finish treatment      Timed Code Treatment Minutes:    50    Total Treatment Minutes:    65    Treatment/Activity Tolerance:  [x] Patient tolerated treatment well [] Patient limited by fatigue  [] Patient limited by pain  [] Patient limited by other medical complications  [x] Other:  Pt tolerated tx well, slight decrease in c/o pain after session.     Pain after treatment:     4/10    Prognosis: [x] Good [] Fair  [] Poor    Patient Requires Follow-up: [x] Yes  [] No    Plan:   [x] Continue per plan of care [] Alter current plan (see comments)  [] Plan of care initiated [] Hold pending MD visit [] Discharge    Plan for Next Session:        Electronically signed by:    Electronically signed by Warren Lopez PTA on 1/10/2018 at 9:26 AM

## 2018-01-17 ENCOUNTER — HOSPITAL ENCOUNTER (OUTPATIENT)
Dept: PHYSICAL THERAPY | Age: 48
Discharge: HOME | End: 2018-01-17

## 2018-01-17 NOTE — FLOWSHEET NOTE
term goal 1: Achieve LBP at 3/10 with ADL's and I-ADL's. - not met - no significant change  Short term goal 2: Increase lumbar AROM by 5-10 degrees in the noted deficits. Short term goal 3: Be independent with HEP. - met  Short term goal 4: Achieve a 25% improvement with LEFS and Back Index scores. - Not met, but both improved slightly from initial visit  Long term goals  Time Frame for Long term goals : 6-8 weeks  Long term goal 1: Achieve LBP at 1/10 with ADL's and I-ADL's. Long term goal 2: Achieve a 50-75% improvement with LEFS and Back Index scores. Long term goal 3: Return to work to at least modified duty.     Some improvement with functional mobility; pain c/o are essentially unchanged; guarded movement with FF.       Patient Requires Follow-up: [x] Yes  [] No    Plan:   [x] Continue per plan of care [] Alter current plan (see comments)  [] Plan of care initiated [] Hold pending MD visit [] Discharge    Plan for Next Session:        Electronically signed by:    Electronically signed by Luis Fernando Martinez PTA on 1/17/2018 at 10:44 AM     Electronically signed by Freddrick Dakins, PT on 1/17/2018 at 2:22 PM

## 2018-01-22 ENCOUNTER — HOSPITAL ENCOUNTER (OUTPATIENT)
Dept: PHYSICAL THERAPY | Age: 48
Discharge: HOME | End: 2018-01-22

## 2018-01-22 NOTE — FLOWSHEET NOTE
Physical Therapy Daily Note   Date:  2018    TIme In:   907               Time Out: 1010        Patient Name:  Rhonda Ashley    :  1970  MRN: [id number]     Restrictions/Precautions:    Pertinent Medical History:   Medical/Treatment Diagnosis Information:  ·   s/p lumbar laminectomy; LBP, left leg pain       Insurance/Certification information:    [employer] Don[employer] worker's compensation   Physician Information:   Viktor Sampson MD  Plan of care signed (Y/N):    Visit# / total visits:    15/    G-Code (if applicable):      Date / Visit # G-Code Applied:         Progress Note: []  Yes  [x]  No  Next due by: Visit #10      Pain level: 4/10    Subjective:  Pt reports she gets his wife to rub his back sometimes and it seems to help some. Objective:  Observation:   Test measurements:   Lumbar AROM: flex: 20 deg, ext: 10 deg,(L)SB: 15 deg, (R)SB: 20 deg. LEFS: 42%. Back index: 60%. Palpation:    Exercises:  Exercise Resistance/Repetitions Other comments   Nustep L5 x 10' 22   Mini squats 2x15 22   Standing marches 1'x2 22   LTR 4' 25   SKTC 15\"x5 22   Hip isometrics: add,abd 5\"x20 22   Supine active HS stretch, with active ankle DF  10\"x10 22   Ab bracing 5\"x20 22   PPT 5\"x20 22                    Other Therapeutic Activities:      Manual Treatments:  STM lumbar paraspinal     Modalities:   with IFC e-stim - lumbar paraspinals x 15' to finish treatment      Timed Code Treatment Minutes:   55    Total Treatment Minutes:   63    Treatment/Activity Tolerance:  [x] Patient tolerated treatment well [] Patient limited by fatigue  [] Patient limited by pain  [] Patient limited by other medical complications  [x] Other:  Pt tolerated tx well with muscle spasms noted in left lumbar paraspinals and decreased muscle tension in left post hip.     Pain after treatment:    4 /10    Prognosis: [x] Good [] Fair  [] Poor    Goals  Short term goals  Time Frame for Short term goals: 3-4 weeks  Short term goal 1: Achieve LBP

## 2018-01-24 ENCOUNTER — HOSPITAL ENCOUNTER (OUTPATIENT)
Dept: PHYSICAL THERAPY | Age: 48
Discharge: HOME | End: 2018-01-24

## 2018-01-26 ENCOUNTER — TRANSCRIBE ORDERS (OUTPATIENT)
Dept: ADMINISTRATIVE | Facility: HOSPITAL | Age: 48
End: 2018-01-26

## 2018-01-26 DIAGNOSIS — M54.5 LOW BACK PAIN, UNSPECIFIED BACK PAIN LATERALITY, UNSPECIFIED CHRONICITY, WITH SCIATICA PRESENCE UNSPECIFIED: Primary | ICD-10-CM

## 2018-01-31 ENCOUNTER — HOSPITAL ENCOUNTER (OUTPATIENT)
Dept: PHYSICAL THERAPY | Age: 48
Discharge: HOME | End: 2018-02-03

## 2018-02-01 ENCOUNTER — HOSPITAL ENCOUNTER (OUTPATIENT)
Dept: MRI IMAGING | Facility: HOSPITAL | Age: 48
Discharge: HOME | End: 2018-02-01
Attending: NEUROLOGICAL SURGERY | Admitting: NEUROLOGICAL SURGERY

## 2018-02-01 ENCOUNTER — TRANSCRIBE ORDERS (OUTPATIENT)
Dept: ADMINISTRATIVE | Facility: HOSPITAL | Age: 48
End: 2018-02-01

## 2018-02-01 ENCOUNTER — HOSPITAL ENCOUNTER (OUTPATIENT)
Dept: GENERAL RADIOLOGY | Facility: HOSPITAL | Age: 48
Discharge: HOME | End: 2018-02-01
Attending: NEUROLOGICAL SURGERY | Admitting: NEUROLOGICAL SURGERY

## 2018-02-01 ENCOUNTER — HOSPITAL ENCOUNTER (OUTPATIENT)
Dept: OTHER | Age: 48
Discharge: HOME | End: 2018-02-28
Attending: ORTHOPAEDIC SURGERY | Admitting: ORTHOPAEDIC SURGERY

## 2018-02-01 DIAGNOSIS — M54.5 LOW BACK PAIN, UNSPECIFIED BACK PAIN LATERALITY, UNSPECIFIED CHRONICITY, WITH SCIATICA PRESENCE UNSPECIFIED: ICD-10-CM

## 2018-02-01 DIAGNOSIS — M54.5 LOW BACK PAIN, UNSPECIFIED BACK PAIN LATERALITY, UNSPECIFIED CHRONICITY, WITH SCIATICA PRESENCE UNSPECIFIED: Primary | ICD-10-CM

## 2018-02-01 PROCEDURE — 72120 X-RAY BEND ONLY L-S SPINE: CPT

## 2018-02-01 PROCEDURE — 0 GADOBENATE DIMEGLUMINE 529 MG/ML SOLUTION: Performed by: NEUROLOGICAL SURGERY

## 2018-02-01 PROCEDURE — A9577 INJ MULTIHANCE: HCPCS | Performed by: NEUROLOGICAL SURGERY

## 2018-02-01 PROCEDURE — 72158 MRI LUMBAR SPINE W/O & W/DYE: CPT

## 2018-02-01 RX ADMIN — GADOBENATE DIMEGLUMINE 20 ML: 529 INJECTION, SOLUTION INTRAVENOUS at 12:20

## 2018-03-01 ENCOUNTER — HOSPITAL ENCOUNTER (OUTPATIENT)
Dept: OTHER | Age: 48
Discharge: HOME | End: 2018-03-31
Attending: ORTHOPAEDIC SURGERY | Admitting: ORTHOPAEDIC SURGERY

## 2020-01-13 ENCOUNTER — HOSPITAL ENCOUNTER (OUTPATIENT)
Facility: HOSPITAL | Age: 50
Discharge: HOME | End: 2020-01-13
Payer: COMMERCIAL

## 2020-01-13 LAB
RAPID INFLUENZA  B AGN: NEGATIVE
RAPID INFLUENZA A AGN: POSITIVE

## 2020-01-13 PROCEDURE — 87804 INFLUENZA ASSAY W/OPTIC: CPT

## 2020-11-02 ENCOUNTER — TRANSCRIBE ORDERS (OUTPATIENT)
Dept: LAB | Facility: HOSPITAL | Age: 50
End: 2020-11-02

## 2020-11-02 ENCOUNTER — LAB (OUTPATIENT)
Dept: LAB | Facility: HOSPITAL | Age: 50
End: 2020-11-02

## 2020-11-02 ENCOUNTER — HOSPITAL ENCOUNTER (OUTPATIENT)
Dept: CARDIOLOGY | Facility: HOSPITAL | Age: 50
Discharge: HOME | End: 2020-11-02

## 2020-11-02 DIAGNOSIS — Z00.00 ROUTINE GENERAL MEDICAL EXAMINATION AT A HEALTH CARE FACILITY: ICD-10-CM

## 2020-11-02 DIAGNOSIS — R55 SYNCOPE AND COLLAPSE: ICD-10-CM

## 2020-11-02 DIAGNOSIS — R53.83 TIREDNESS: ICD-10-CM

## 2020-11-02 DIAGNOSIS — I10 ESSENTIAL HYPERTENSION, MALIGNANT: ICD-10-CM

## 2020-11-02 DIAGNOSIS — I10 ESSENTIAL HYPERTENSION, MALIGNANT: Primary | ICD-10-CM

## 2020-11-02 DIAGNOSIS — R73.09 IMPAIRED GLUCOSE TOLERANCE TEST: ICD-10-CM

## 2020-11-02 LAB
25(OH)D3 SERPL-MCNC: 34 NG/ML (ref 30–100)
ALBUMIN SERPL-MCNC: 4.5 G/DL (ref 3.5–5.2)
ALBUMIN/GLOB SERPL: 1.5 G/DL
ALP SERPL-CCNC: 70 U/L (ref 39–117)
ALT SERPL W P-5'-P-CCNC: 26 U/L (ref 1–41)
ANION GAP SERPL CALCULATED.3IONS-SCNC: 9.9 MMOL/L (ref 5–15)
AST SERPL-CCNC: 17 U/L (ref 1–40)
BILIRUB SERPL-MCNC: 0.8 MG/DL (ref 0–1.2)
BUN SERPL-MCNC: 14 MG/DL (ref 6–20)
BUN/CREAT SERPL: 13.9 (ref 7–25)
CALCIUM SPEC-SCNC: 9.6 MG/DL (ref 8.6–10.5)
CHLORIDE SERPL-SCNC: 102 MMOL/L (ref 98–107)
CHOLEST SERPL-MCNC: 144 MG/DL (ref 0–200)
CO2 SERPL-SCNC: 28.1 MMOL/L (ref 22–29)
CREAT SERPL-MCNC: 1.01 MG/DL (ref 0.76–1.27)
DEPRECATED RDW RBC AUTO: 45.8 FL (ref 37–54)
ERYTHROCYTE [DISTWIDTH] IN BLOOD BY AUTOMATED COUNT: 12.8 % (ref 12.3–15.4)
FOLATE SERPL-MCNC: 8.91 NG/ML (ref 4.78–24.2)
GFR SERPL CREATININE-BSD FRML MDRD: 78 ML/MIN/1.73
GLOBULIN UR ELPH-MCNC: 3 GM/DL
GLUCOSE SERPL-MCNC: 98 MG/DL (ref 65–99)
HBA1C MFR BLD: 5.64 % (ref 4.8–5.6)
HCT VFR BLD AUTO: 46 % (ref 37.5–51)
HDLC SERPL-MCNC: 32 MG/DL (ref 40–60)
HGB BLD-MCNC: 15.7 G/DL (ref 13–17.7)
LDLC SERPL CALC-MCNC: 86 MG/DL (ref 0–100)
LDLC/HDLC SERPL: 2.59 {RATIO}
MCH RBC QN AUTO: 32.8 PG (ref 26.6–33)
MCHC RBC AUTO-ENTMCNC: 34.1 G/DL (ref 31.5–35.7)
MCV RBC AUTO: 96 FL (ref 79–97)
PLAT MORPH BLD: NORMAL
PLATELET # BLD AUTO: 212 10*3/MM3 (ref 140–450)
PMV BLD AUTO: 10.9 FL (ref 6–12)
POTASSIUM SERPL-SCNC: 3.8 MMOL/L (ref 3.5–5.2)
PROT SERPL-MCNC: 7.5 G/DL (ref 6–8.5)
RBC # BLD AUTO: 4.79 10*6/MM3 (ref 4.14–5.8)
RBC MORPH BLD: NORMAL
SODIUM SERPL-SCNC: 140 MMOL/L (ref 136–145)
TRIGL SERPL-MCNC: 146 MG/DL (ref 0–150)
TSH SERPL DL<=0.05 MIU/L-ACNC: 2.32 UIU/ML (ref 0.27–4.2)
VIT B12 BLD-MCNC: 405 PG/ML (ref 211–946)
VLDLC SERPL-MCNC: 26 MG/DL (ref 5–40)
WBC # BLD AUTO: 6.16 10*3/MM3 (ref 3.4–10.8)
WBC MORPH BLD: NORMAL

## 2020-11-02 PROCEDURE — 83036 HEMOGLOBIN GLYCOSYLATED A1C: CPT | Performed by: NURSE PRACTITIONER

## 2020-11-02 PROCEDURE — 80061 LIPID PANEL: CPT | Performed by: NURSE PRACTITIONER

## 2020-11-02 PROCEDURE — 82746 ASSAY OF FOLIC ACID SERUM: CPT | Performed by: NURSE PRACTITIONER

## 2020-11-02 PROCEDURE — 85025 COMPLETE CBC W/AUTO DIFF WBC: CPT | Performed by: NURSE PRACTITIONER

## 2020-11-02 PROCEDURE — 84443 ASSAY THYROID STIM HORMONE: CPT | Performed by: NURSE PRACTITIONER

## 2020-11-02 PROCEDURE — 93005 ELECTROCARDIOGRAM TRACING: CPT | Performed by: NURSE PRACTITIONER

## 2020-11-02 PROCEDURE — 93010 ELECTROCARDIOGRAM REPORT: CPT | Performed by: INTERNAL MEDICINE

## 2020-11-02 PROCEDURE — 82306 VITAMIN D 25 HYDROXY: CPT | Performed by: NURSE PRACTITIONER

## 2020-11-02 PROCEDURE — 80053 COMPREHEN METABOLIC PANEL: CPT | Performed by: NURSE PRACTITIONER

## 2020-11-02 PROCEDURE — 85007 BL SMEAR W/DIFF WBC COUNT: CPT

## 2020-11-02 PROCEDURE — 36415 COLL VENOUS BLD VENIPUNCTURE: CPT

## 2020-11-02 PROCEDURE — 82607 VITAMIN B-12: CPT | Performed by: NURSE PRACTITIONER

## 2020-11-05 LAB
QT INTERVAL: 364 MS
QTC INTERVAL: 425 MS

## 2020-11-18 ENCOUNTER — CONSULT (OUTPATIENT)
Dept: CARDIOLOGY | Facility: CLINIC | Age: 50
End: 2020-11-18

## 2020-11-18 VITALS
SYSTOLIC BLOOD PRESSURE: 128 MMHG | HEART RATE: 78 BPM | BODY MASS INDEX: 31.02 KG/M2 | DIASTOLIC BLOOD PRESSURE: 80 MMHG | HEIGHT: 72 IN | OXYGEN SATURATION: 98 % | WEIGHT: 229 LBS

## 2020-11-18 DIAGNOSIS — R55 SYNCOPE AND COLLAPSE: ICD-10-CM

## 2020-11-18 DIAGNOSIS — I10 ESSENTIAL HYPERTENSION, MALIGNANT: Primary | ICD-10-CM

## 2020-11-18 PROCEDURE — 99243 OFF/OP CNSLTJ NEW/EST LOW 30: CPT | Performed by: INTERNAL MEDICINE

## 2020-11-18 RX ORDER — HYDROCHLOROTHIAZIDE 25 MG/1
25 TABLET ORAL DAILY
COMMUNITY
Start: 2020-11-03 | End: 2022-09-23 | Stop reason: SDUPTHER
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
## 2020-12-23 ENCOUNTER — OFFICE VISIT (OUTPATIENT)
Dept: CARDIOLOGY | Facility: CLINIC | Age: 50
End: 2020-12-23

## 2020-12-23 VITALS
HEART RATE: 90 BPM | OXYGEN SATURATION: 98 % | WEIGHT: 227 LBS | SYSTOLIC BLOOD PRESSURE: 114 MMHG | DIASTOLIC BLOOD PRESSURE: 68 MMHG | HEIGHT: 72 IN | BODY MASS INDEX: 30.75 KG/M2

## 2020-12-23 DIAGNOSIS — I10 ESSENTIAL HYPERTENSION, MALIGNANT: ICD-10-CM

## 2020-12-23 DIAGNOSIS — R55 SYNCOPE AND COLLAPSE: Primary | ICD-10-CM

## 2020-12-23 PROCEDURE — 99213 OFFICE O/P EST LOW 20 MIN: CPT | Performed by: INTERNAL MEDICINE

## 2020-12-23 NOTE — PROGRESS NOTES
Subjective:     Encounter Date:12/23/2020      Patient ID: Cristian Desai is a 50 y.o. male.    Chief Complaint: Syncope  HPI  This is a 50-year-old male patient with a one-time episode of syncope.  The patient had an echocardiogram performed which showed mild concentric left ventricular hypertrophy, borderline-mild left atrial enlargement and diastolic dysfunction consistent with hypertensive cardiac disease.  The ejection fraction was normal.  There were no regional wall motion abnormalities.  There was no evidence of valvular heart disease pericardial disease or intracardiac shunting.  The patient also wore a 30-day cardiac monitor with no evidence of arrhythmia.  The patient indicates he is remained active and has had no further episodes of syncope or presyncope.  He has no dizziness or palpitations.  He reports compliance with his medications with no perceived side effects.  He remains a non-smoker.  The following portions of the patient's history were reviewed and updated as appropriate: allergies, current medications, past family history, past medical history, past social history, past surgical history and problem  Review of Systems   Constitution: Negative for chills, diaphoresis, fever, malaise/fatigue, weight gain and weight loss.   HENT: Negative for ear discharge, hearing loss, hoarse voice and nosebleeds.    Eyes: Negative for discharge, double vision, pain and photophobia.   Cardiovascular: Negative for chest pain, claudication, cyanosis, dyspnea on exertion, irregular heartbeat, leg swelling, near-syncope, orthopnea, palpitations, paroxysmal nocturnal dyspnea and syncope.   Respiratory: Negative for cough, hemoptysis, shortness of breath, sputum production and wheezing.    Endocrine: Negative for cold intolerance, heat intolerance, polydipsia, polyphagia and polyuria.   Hematologic/Lymphatic: Negative for adenopathy and bleeding problem. Does not bruise/bleed easily.   Skin: Negative for color  "change, flushing, itching and rash.   Musculoskeletal: Negative for muscle cramps, muscle weakness, myalgias and stiffness.   Gastrointestinal: Negative for abdominal pain, diarrhea, hematemesis, hematochezia, nausea and vomiting.   Genitourinary: Negative for dysuria, frequency and nocturia.   Neurological: Negative for focal weakness, loss of balance, numbness, paresthesias and seizures.   Psychiatric/Behavioral: Negative for altered mental status, hallucinations and suicidal ideas.   Allergic/Immunologic: Negative for HIV exposure, hives and persistent infections.           Current Outpatient Medications:   •  hydroCHLOROthiazide (HYDRODIURIL) 25 MG tablet, Take 25 mg by mouth Daily., Disp: , Rfl:   •  losartan (COZAAR) 100 MG tablet, Take 100 mg by mouth daily, Disp: , Rfl:     Objective:   Vitals signs and nursing note reviewed.   Constitutional:       Appearance: Healthy appearance. Not in distress.   Neck:      Vascular: No JVR. JVD normal.   Pulmonary:      Effort: Pulmonary effort is normal.      Breath sounds: Normal breath sounds. No wheezing. No rhonchi. No rales.   Chest:      Chest wall: Not tender to palpatation.   Cardiovascular:      PMI at left midclavicular line. Normal rate. Regular rhythm. Normal S1. Normal S2.      Murmurs: There is no murmur.      No gallop. No click. No rub.   Pulses:     Intact distal pulses.   Edema:     Peripheral edema absent.   Abdominal:      General: Bowel sounds are normal.      Palpations: Abdomen is soft.      Tenderness: There is no abdominal tenderness.   Musculoskeletal: Normal range of motion.         General: No tenderness.   Skin:     General: Skin is warm and dry.   Neurological:      General: No focal deficit present.      Mental Status: Alert and oriented to person, place and time.       Blood pressure 114/68, pulse 90, height 182.9 cm (72\"), weight 103 kg (227 lb), SpO2 98 %.   Lab Review:     Assessment:       1. Syncope and collapse  No specific cardiac " etiology suspected or identified.  No recurrent syncopal episodes.    2. Essential hypertension, malignant  Improving blood pressure control.    Procedures    Plan:     No additional cardiovascular testing is warranted.  No changes in his medications have been made at today's visit.  We have reinforced the education and lessons from his initial evaluation.

## 2022-09-23 ENCOUNTER — OFFICE VISIT (OUTPATIENT)
Dept: INTERNAL MEDICINE | Facility: CLINIC | Age: 52
End: 2022-09-23

## 2022-09-23 VITALS
BODY MASS INDEX: 30.88 KG/M2 | WEIGHT: 228 LBS | TEMPERATURE: 97.8 F | SYSTOLIC BLOOD PRESSURE: 130 MMHG | OXYGEN SATURATION: 98 % | HEIGHT: 72 IN | HEART RATE: 80 BPM | DIASTOLIC BLOOD PRESSURE: 88 MMHG

## 2022-09-23 DIAGNOSIS — E78.2 MIXED HYPERLIPIDEMIA: ICD-10-CM

## 2022-09-23 DIAGNOSIS — Z12.11 SCREEN FOR COLON CANCER: ICD-10-CM

## 2022-09-23 DIAGNOSIS — I10 HTN (HYPERTENSION), BENIGN: Primary | ICD-10-CM

## 2022-09-23 PROCEDURE — 99204 OFFICE O/P NEW MOD 45 MIN: CPT | Performed by: INTERNAL MEDICINE

## 2022-09-23 RX ORDER — LOSARTAN POTASSIUM 100 MG/1
100 TABLET ORAL DAILY
Qty: 90 TABLET | Refills: 3 | Status: SHIPPED | OUTPATIENT
Start: 2022-09-23

## 2022-09-23 RX ORDER — HYDROCHLOROTHIAZIDE 25 MG/1
25 TABLET ORAL DAILY
Qty: 90 TABLET | Refills: 3 | Status: SHIPPED | OUTPATIENT
Start: 2022-09-23

## 2022-09-23 NOTE — PROGRESS NOTES
"Subjective  Cristian Desai is a 51 y.o. male    HPI 51-year-old male with a history of hypertension coming in to establish care here.  He has noticed some elevated BP readings at home currently on losartan and HCTZ.  He is compliant with his medications.  No alcohol or tobacco use.  Reasonably compliant with a low-sodium diet.  Denies recent weight gain he denies sleep disturbances    The following portions of the patient's history were reviewed and updated as appropriate: allergies, current medications, past family history, past medical history, past social history, past surgical history, and problem list.     Review of Systems   Constitutional: Negative.  Negative for activity change, appetite change, fatigue and fever.   HENT: Negative for congestion, ear discharge, ear pain and trouble swallowing.    Eyes: Negative for photophobia and visual disturbance.   Respiratory: Negative for cough and shortness of breath.    Cardiovascular: Negative for chest pain and palpitations.   Gastrointestinal: Negative for abdominal distention, abdominal pain, constipation, diarrhea, nausea and vomiting.   Endocrine: Negative.    Genitourinary: Negative for dysuria, hematuria and urgency.   Musculoskeletal: Positive for arthralgias. Negative for back pain, joint swelling and myalgias.   Skin: Negative for color change and rash.   Allergic/Immunologic: Negative.    Neurological: Negative for dizziness, weakness, light-headedness and headaches.   Hematological: Negative for adenopathy. Does not bruise/bleed easily.   Psychiatric/Behavioral: Negative for agitation, confusion and dysphoric mood. The patient is not nervous/anxious.        Visit Vitals  /88   Pulse 80   Temp 97.8 °F (36.6 °C) (Infrared)   Ht 182.9 cm (72\")   Wt 103 kg (228 lb)   SpO2 98%   BMI 30.92 kg/m²       Objective  Physical Exam  Constitutional:       General: He is not in acute distress.     Appearance: He is well-developed.   HENT:      Nose: Nose " normal.   Eyes:      General: No scleral icterus.     Conjunctiva/sclera: Conjunctivae normal.   Neck:      Thyroid: No thyromegaly.      Trachea: No tracheal deviation.   Cardiovascular:      Rate and Rhythm: Normal rate and regular rhythm.      Heart sounds: No murmur heard.    No friction rub.   Pulmonary:      Effort: No respiratory distress.      Breath sounds: No wheezing or rales.   Abdominal:      General: There is no distension.      Palpations: Abdomen is soft. There is no mass.      Tenderness: There is no abdominal tenderness. There is no guarding.   Musculoskeletal:         General: No deformity. Normal range of motion.   Lymphadenopathy:      Cervical: No cervical adenopathy.   Skin:     General: Skin is warm and dry.      Findings: No erythema or rash.   Neurological:      Mental Status: He is alert and oriented to person, place, and time.      Cranial Nerves: No cranial nerve deficit.      Coordination: Coordination normal.      Deep Tendon Reflexes: Reflexes are normal and symmetric.   Psychiatric:         Behavior: Behavior normal.         Thought Content: Thought content normal.         Judgment: Judgment normal.         Diagnoses and all orders for this visit:    HTN (hypertension), benign BP checked again appears okay.  Advised him to follow readings at home consider low-dose beta-blocker if elevated readings noted at home.  In the meantime counseled about dietary restrictions.  Discussed cardiovascular exercise and some weight loss.  Discussed DASH diet    Screen for colon cancer  -     Cologuard - Stool, Per Rectum; Future    Mixed hyperlipidemia counseled about dietary restrictions check lipid profile with next visit  Discussed need for colon cancer screening he would prefer to do this with Cologuard.  Order placed  Refills on meds given

## 2022-12-06 ENCOUNTER — PATIENT OUTREACH (OUTPATIENT)
Dept: CASE MANAGEMENT | Facility: OTHER | Age: 52
End: 2022-12-06

## 2022-12-06 NOTE — OUTREACH NOTE
AMBULATORY CASE MANAGEMENT NOTE    Name and Relationship of Patient/Support Person: Cristian Desai - Self      Education Documentation  Self-Care, taught by Hanh Verma RN at 12/6/2022  1:11 PM.  Learner: Patient  Readiness: Acceptance  Method: Explanation  Response: Verbalizes Understanding    Medication Management, taught by Hanh Verma RN at 12/6/2022  1:11 PM.  Learner: Patient  Readiness: Acceptance  Method: Explanation  Response: Verbalizes Understanding    Blood Pressure Monitoring, taught by Hanh Verma RN at 12/6/2022  1:11 PM.  Learner: Patient  Readiness: Acceptance  Method: Explanation  Response: Verbalizes Understanding        Adult Patient Profile  Questions/Answers    Flowsheet Row Most Recent Value   Symptoms/Conditions Managed at Home cardiovascular   Barriers to Managing Health none   Cardiovascular Symptoms/Conditions hypertension   Cardiovascular Management Strategies diet modification, medication therapy  [pt takes HCTZ in the morning, also tries to monitor his diet]   Cardiovascular Self-Management Outcome 3 (uncertain)   How to be Addressed Mr. Desai   How Well Do You Speak English? very well   Source of Information patient        Adult Wellness Assessment  Questions/Answers    Flowsheet Row Most Recent Value   Help with Reading Health-Related Information never   Problems Learning about Medical Condition never   Confidence in Filling Out Forms by Self always        Send Education  Questions/Answers    Flowsheet Row Most Recent Value   Annual Wellness Visit:  Patient Has Completed  [pt recently switched to [facility] according to the [facility] provider - Dr. Gutierrez]   Other Patient Education/Resources  Advanced Care Planning   ACP Education Method Verbal   Advanced Directives: --  [sent to pt's work and person email per consent]      Patient Outreach    Spoke with patient forengagement call for hypertension management. Introduced self, explained ECM RN role and provided contact information to patient  via  personal and work email per patient consent with address verified via Epic. Education provided for CM  Program. Hypertension education reviewed, resources sent to pt.  Pt has follow up appointments scheduled.  No issue with social determinants,denies food, medication, transportation, or financial insecurities. No questions or concerns per pt at this time. Advised pt to call RN-ECM with any new needs. Encouraged use of [facility] nurseline if needed.  Agrees to follow up outreach.             MARCOS HUSAIN  Ambulatory Case Management    12/6/2022, 13:11 EST

## 2023-01-19 ENCOUNTER — APPOINTMENT (OUTPATIENT)
Dept: GENERAL RADIOLOGY | Facility: HOSPITAL | Age: 53
End: 2023-01-19
Payer: COMMERCIAL

## 2023-01-19 ENCOUNTER — HOSPITAL ENCOUNTER (EMERGENCY)
Facility: HOSPITAL | Age: 53
Discharge: HOME | End: 2023-01-19
Attending: EMERGENCY MEDICINE | Admitting: EMERGENCY MEDICINE
Payer: COMMERCIAL

## 2023-01-19 VITALS
TEMPERATURE: 97.4 F | SYSTOLIC BLOOD PRESSURE: 124 MMHG | HEIGHT: 72 IN | WEIGHT: 232 LBS | HEART RATE: 87 BPM | BODY MASS INDEX: 31.42 KG/M2 | OXYGEN SATURATION: 94 % | RESPIRATION RATE: 16 BRPM | DIASTOLIC BLOOD PRESSURE: 95 MMHG

## 2023-01-19 DIAGNOSIS — I10 HYPERTENSION, UNSPECIFIED TYPE: ICD-10-CM

## 2023-01-19 DIAGNOSIS — R07.9 CHEST PAIN, UNSPECIFIED TYPE: Primary | ICD-10-CM

## 2023-01-19 LAB
ALBUMIN SERPL-MCNC: 4.2 G/DL (ref 3.5–5.2)
ALBUMIN/GLOB SERPL: 1.4 G/DL
ALP SERPL-CCNC: 68 U/L (ref 39–117)
ALT SERPL W P-5'-P-CCNC: 40 U/L (ref 1–41)
ANION GAP SERPL CALCULATED.3IONS-SCNC: 10.8 MMOL/L (ref 5–15)
AST SERPL-CCNC: 30 U/L (ref 1–40)
BASOPHILS # BLD AUTO: 0.05 10*3/MM3 (ref 0–0.2)
BASOPHILS NFR BLD AUTO: 0.8 % (ref 0–1.5)
BILIRUB SERPL-MCNC: 1.3 MG/DL (ref 0–1.2)
BUN SERPL-MCNC: 18 MG/DL (ref 6–20)
BUN/CREAT SERPL: 19.8 (ref 7–25)
CALCIUM SPEC-SCNC: 9.6 MG/DL (ref 8.6–10.5)
CHLORIDE SERPL-SCNC: 100 MMOL/L (ref 98–107)
CO2 SERPL-SCNC: 26.2 MMOL/L (ref 22–29)
CREAT SERPL-MCNC: 0.91 MG/DL (ref 0.76–1.27)
D DIMER PPP FEU-MCNC: 0.37 MCGFEU/ML (ref 0–0.53)
DEPRECATED RDW RBC AUTO: 43.8 FL (ref 37–54)
EGFRCR SERPLBLD CKD-EPI 2021: 100.8 ML/MIN/1.73
EOSINOPHIL # BLD AUTO: 0.14 10*3/MM3 (ref 0–0.4)
EOSINOPHIL NFR BLD AUTO: 2.3 % (ref 0.3–6.2)
ERYTHROCYTE [DISTWIDTH] IN BLOOD BY AUTOMATED COUNT: 12.8 % (ref 12.3–15.4)
GLOBULIN UR ELPH-MCNC: 3.1 GM/DL
GLUCOSE SERPL-MCNC: 125 MG/DL (ref 65–99)
HCT VFR BLD AUTO: 46.4 % (ref 37.5–51)
HGB BLD-MCNC: 16.5 G/DL (ref 13–17.7)
HOLD SPECIMEN: NORMAL
IMM GRANULOCYTES # BLD AUTO: 0.02 10*3/MM3 (ref 0–0.05)
IMM GRANULOCYTES NFR BLD AUTO: 0.3 % (ref 0–0.5)
LIPASE SERPL-CCNC: 39 U/L (ref 13–60)
LYMPHOCYTES # BLD AUTO: 1.67 10*3/MM3 (ref 0.7–3.1)
LYMPHOCYTES NFR BLD AUTO: 27.3 % (ref 19.6–45.3)
MCH RBC QN AUTO: 33.3 PG (ref 26.6–33)
MCHC RBC AUTO-ENTMCNC: 35.6 G/DL (ref 31.5–35.7)
MCV RBC AUTO: 93.5 FL (ref 79–97)
MONOCYTES # BLD AUTO: 0.49 10*3/MM3 (ref 0.1–0.9)
MONOCYTES NFR BLD AUTO: 8 % (ref 5–12)
NEUTROPHILS NFR BLD AUTO: 3.74 10*3/MM3 (ref 1.7–7)
NEUTROPHILS NFR BLD AUTO: 61.3 % (ref 42.7–76)
NRBC BLD AUTO-RTO: 0 /100 WBC (ref 0–0.2)
NT-PROBNP SERPL-MCNC: <5 PG/ML (ref 0–900)
PLATELET # BLD AUTO: 171 10*3/MM3 (ref 140–450)
PMV BLD AUTO: 10.2 FL (ref 6–12)
POTASSIUM SERPL-SCNC: 3.7 MMOL/L (ref 3.5–5.2)
PROT SERPL-MCNC: 7.3 G/DL (ref 6–8.5)
RBC # BLD AUTO: 4.96 10*6/MM3 (ref 4.14–5.8)
SODIUM SERPL-SCNC: 137 MMOL/L (ref 136–145)
TROPONIN T SERPL-MCNC: <0.01 NG/ML (ref 0–0.03)
WBC NRBC COR # BLD: 6.11 10*3/MM3 (ref 3.4–10.8)
WHOLE BLOOD HOLD COAG: NORMAL
WHOLE BLOOD HOLD SPECIMEN: NORMAL

## 2023-01-19 PROCEDURE — 83880 ASSAY OF NATRIURETIC PEPTIDE: CPT | Performed by: EMERGENCY MEDICINE

## 2023-01-19 PROCEDURE — 85025 COMPLETE CBC W/AUTO DIFF WBC: CPT | Performed by: EMERGENCY MEDICINE

## 2023-01-19 PROCEDURE — 71045 X-RAY EXAM CHEST 1 VIEW: CPT

## 2023-01-19 PROCEDURE — 36415 COLL VENOUS BLD VENIPUNCTURE: CPT

## 2023-01-19 PROCEDURE — 85379 FIBRIN DEGRADATION QUANT: CPT | Performed by: EMERGENCY MEDICINE

## 2023-01-19 PROCEDURE — 80053 COMPREHEN METABOLIC PANEL: CPT | Performed by: EMERGENCY MEDICINE

## 2023-01-19 PROCEDURE — 83690 ASSAY OF LIPASE: CPT | Performed by: EMERGENCY MEDICINE

## 2023-01-19 PROCEDURE — 99283 EMERGENCY DEPT VISIT LOW MDM: CPT

## 2023-01-19 PROCEDURE — 93005 ELECTROCARDIOGRAM TRACING: CPT | Performed by: EMERGENCY MEDICINE

## 2023-01-19 PROCEDURE — 84484 ASSAY OF TROPONIN QUANT: CPT | Performed by: EMERGENCY MEDICINE

## 2023-01-19 RX ORDER — AMLODIPINE BESYLATE 2.5 MG/1
2.5 TABLET ORAL DAILY
Qty: 30 TABLET | Refills: 0 | Status: SHIPPED | OUTPATIENT
Start: 2023-01-19 | End: 2023-01-26 | Stop reason: SDUPTHER

## 2023-01-19 RX ORDER — SODIUM CHLORIDE 0.9 % (FLUSH) 0.9 %
10 SYRINGE (ML) INJECTION AS NEEDED
Status: DISCONTINUED | OUTPATIENT
Start: 2023-01-19 | End: 2023-01-19 | Stop reason: HOSPADM

## 2023-01-19 NOTE — ED PROVIDER NOTES
"Subjective   History of Present Illness  52-year-old male presents to the ED with a chief complaint of chest pain.  Patient is complaining of multiple weeks worth of chest pain.  He states that it is not really pain but it is more a discomfort and that his chest feels \"different\" over the last 3 to 4 weeks.  He cannot actually describe.  No sharp stabbing pain.  No constant pressure aching.  Pain is not exacerbated with food.  No nausea vomiting diarrhea or abdominal pain.  No lightheadedness or dizziness.  Notes that his blood pressures been running high.  No other complaints at this time.        Review of Systems   Constitutional: Negative for fever.   Respiratory: Positive for shortness of breath. Negative for cough, chest tightness and wheezing.    Cardiovascular: Positive for chest pain. Negative for palpitations.   All other systems reviewed and are negative.      Past Medical History:   Diagnosis Date   • Arthritis    • Gout    • Hypertension        No Known Allergies    Past Surgical History:   Procedure Laterality Date   • HERNIA REPAIR Left        Family History   Problem Relation Age of Onset   • Cancer Maternal Grandmother    • Cancer Maternal Grandfather    • Hypertension Mother    • Stroke Mother    • Cancer Other    • Diabetes Other    • Hypertension Other    • Stroke Other        Social History     Socioeconomic History   • Marital status:    Tobacco Use   • Smoking status: Never   • Smokeless tobacco: Never   Substance and Sexual Activity   • Alcohol use: Yes     Comment: occasionally   • Sexual activity: Defer           Objective   Physical Exam  Vitals and nursing note reviewed.   Constitutional:       General: He is not in acute distress.     Appearance: He is well-developed. He is not diaphoretic.   HENT:      Head: Normocephalic and atraumatic.      Nose: Nose normal.   Eyes:      Conjunctiva/sclera: Conjunctivae normal.      Pupils: Pupils are equal, round, and reactive to light. "   Cardiovascular:      Rate and Rhythm: Normal rate and regular rhythm.   Pulmonary:      Effort: Pulmonary effort is normal. No respiratory distress.      Breath sounds: Normal breath sounds.   Abdominal:      General: There is no distension.      Palpations: Abdomen is soft.      Tenderness: There is no abdominal tenderness.   Musculoskeletal:         General: No deformity.   Neurological:      Mental Status: He is alert and oriented to person, place, and time.      Cranial Nerves: No cranial nerve deficit.      Coordination: Coordination normal.         Procedures           ED Course                                           MDM  Chief complaint: Chest pain     Comorbidities requiring consideration and/or management:    Differential diagnosis includes but not limited to: STEMI, ACS, NSTEMI, hypertensive urgency/emergency, GERD, pleurisy, pneumonia, other    Patient arrives hemodynamically stable stable, afebrile, without respiratory distress with initial vitals interpreted by myself.  Pertinent initial vitals include mildly hypertensive at 152/98, heart rate of 100, respiratory rate of 18 pulse ox of 95% on room air    Pertinent features from physical exam: Normal radial pulses bilaterally, resting comfortably, heart regular rate and rhythm, breath sounds clear and equal bilaterally, normal respiratory effort.    Initial diagnostic plan: CBC, CMP, D-dimer, troponin, BNP, chest x-ray, EKG    Results from initial plan were reviewed and interpreted by myself and include: Initial EKG interpreted by me.  Sinus rhythm.  Rate of 99.  No obvious ST or T wave changes.  Otherwise normal EKG. troponin negative, BNP negative, chest x-ray negative for acute process.  Lipase normal, D-dimer normal, troponin negative.      Diagnostic information from other sources includes: Review of previous visits, prior labs, prior imaging, available notes from prior evaluations or visits with specialists, medication list, allergies, past  medical history, past surgical history    Interventions in the ED included: Aspirin, continuous cardiac monitoring, continuous pulse oximetry,    Reevaluation: Resting comfortably, continues to be pain-free or minimal pain.    Results/clinical rationale were discussed with patient and wife at bedside    Consultations and discussions of results with other physicians: None    Discussion of results/management/plan: Low suspicion for ACS given the fact the patient is complaining of discomfort has been low-grade for 3 weeks or longer.  It is not exacerbated with activity.  Unsure if it is related to food.  Could be GERD versus esophagitis versus other.  Very low suspicion for ACS given negative work-up today, normal troponin, normal EKG, normal chest x-ray, did consider pulmonary embolism but vital signs are stable D-dimer was negative, low suspicion for dissection as radial pulses were normal, no widened mediastinum on chest x-ray.  Physical exam is otherwise unremarkable.  Work-up was unremarkable.  Appropriate for discharge to follow-up outpatient with cardiology, heart score of 2.        Disposition plan: Discharge home to follow-up outpatient as needed..      Final diagnoses:   Chest pain, unspecified type   Hypertension, unspecified type       ED Disposition  ED Disposition     ED Disposition   Discharge    Condition   Stable    Comment   --             Hans Gutierrez MD  [address]  [address]  [facility] [id number] [phone]          Ty Whaley MD  [address] [facility] [address]  [address]  [facility] [id number] [phone]               Medication List      New Prescriptions    amLODIPine 2.5 MG tablet  Commonly known as: NORVASC  Take 1 tablet by mouth Daily.           Where to Get Your Medications      These medications were sent to [facility] - [address], [facility] [address]    Hours: 9AM-6PM Mon-Fri Phone: [phone]   · amLODIPine 2.5 MG tablet           Shaw Workman, DO  01/21/23 0731

## 2023-01-20 ENCOUNTER — PATIENT OUTREACH (OUTPATIENT)
Dept: CASE MANAGEMENT | Facility: OTHER | Age: 53
End: 2023-01-20
Payer: COMMERCIAL

## 2023-01-20 NOTE — OUTREACH NOTE
AMBULATORY CASE MANAGEMENT NOTE    Name and Relationship of Patient/Support Person: Lloyd Cristian PECK - Self        MARCOS LISHA  Ambulatory Case Management    1/20/2023, 16:50 EST    Spoke with patient for follow-up  Call from ER visit for chest pain and high blood pressure. Pt denied chest pain currently or SOA> reports blood pressure better managed. compliant with existing and new medications. Reviewed importance of checking blood pressure regularly. Info sent to pt for [facility] htn program.   Pt states ER doctor talked aboout medication for reflux, and suggested talking to his PCP     Pt has follow up appointments scheduled.  No issue with social determinants,denies food, medication, transportation, or financial insecurities. No questions or concerns per pt at this time. Advised pt to call RN-ECM with any new needs. Encouraged use of [facility] nurseline if needed.  Agrees to follow up outreach.

## 2023-01-26 ENCOUNTER — OFFICE VISIT (OUTPATIENT)
Dept: INTERNAL MEDICINE | Facility: CLINIC | Age: 53
End: 2023-01-26
Payer: COMMERCIAL

## 2023-01-26 VITALS
BODY MASS INDEX: 31.02 KG/M2 | HEIGHT: 72 IN | SYSTOLIC BLOOD PRESSURE: 132 MMHG | DIASTOLIC BLOOD PRESSURE: 94 MMHG | WEIGHT: 229 LBS | OXYGEN SATURATION: 98 % | TEMPERATURE: 97.3 F | HEART RATE: 84 BPM

## 2023-01-26 DIAGNOSIS — Z00.00 ROUTINE GENERAL MEDICAL EXAMINATION AT A HEALTH CARE FACILITY: Primary | ICD-10-CM

## 2023-01-26 DIAGNOSIS — I10 HTN (HYPERTENSION), BENIGN: ICD-10-CM

## 2023-01-26 PROCEDURE — 99396 PREV VISIT EST AGE 40-64: CPT | Performed by: INTERNAL MEDICINE

## 2023-01-26 RX ORDER — AMLODIPINE BESYLATE 2.5 MG/1
2.5 TABLET ORAL DAILY
Qty: 30 TABLET | Refills: 5 | Status: SHIPPED | OUTPATIENT
Start: 2023-01-26

## 2023-01-26 NOTE — PROGRESS NOTES
Chief Complaint   Patient presents with   • Annual Exam     physical       Cristian Desai is a 52 y.o. male and is here for a comprehensive physical exam. The patient reports problems - chest pain.     History:  Erectile dysfunction  no  Nocturia  no      Do you take any herbs or supplements that were not prescribed by a doctor? no    Are you taking aspirin daily? no      Health Habits:  Dental Exam. unknown  Eye Exam. up to date  Exercise: 0 times/week.  Current exercise activities include: none and yard work    Health Maintenance   Topic Date Due   • COLORECTAL CANCER SCREENING  Never done   • TDAP/TD VACCINES (1 - Tdap) Never done   • HEPATITIS C SCREENING  Never done   • ANNUAL PHYSICAL  Never done   • ZOSTER VACCINE (1 of 2) Never done   • COVID-19 Vaccine (3 - Booster for Pfizer series) 11/26/2021   • INFLUENZA VACCINE  Never done   • LIPID PANEL  09/23/2022   • Pneumococcal Vaccine 0-64  Aged Out       PMH, PSH, SocHx, FamHx, Allergies, and Medications: Reviewed and updated in the Visit Navigator.     No Known Allergies  Past Medical History:   Diagnosis Date   • Arthritis    • Gout    • Hypertension      Past Surgical History:   Procedure Laterality Date   • HERNIA REPAIR Left      Social History     Socioeconomic History   • Marital status:    Tobacco Use   • Smoking status: Never   • Smokeless tobacco: Never   Substance and Sexual Activity   • Alcohol use: Yes     Comment: occasionally   • Drug use: Never   • Sexual activity: Defer     Family History   Problem Relation Age of Onset   • Cancer Maternal Grandmother    • Cancer Maternal Grandfather    • Hypertension Mother    • Stroke Mother    • Cancer Other    • Diabetes Other    • Hypertension Other    • Stroke Other        Review of Systems  Review of Systems   Constitutional: Positive for fatigue. Negative for activity change, appetite change and fever.   HENT: Negative for congestion, ear discharge, ear pain and trouble swallowing.    Eyes:  "Negative for photophobia and visual disturbance.   Respiratory: Negative for cough and shortness of breath.    Cardiovascular: Negative for chest pain and palpitations.   Gastrointestinal: Negative for abdominal distention, abdominal pain, constipation, diarrhea, nausea and vomiting.   Endocrine: Negative.    Genitourinary: Negative for dysuria, hematuria and urgency.   Musculoskeletal: Positive for arthralgias and back pain. Negative for joint swelling and myalgias.   Skin: Negative for color change and rash.   Allergic/Immunologic: Negative.    Neurological: Negative for dizziness, weakness, light-headedness and headaches.   Hematological: Negative for adenopathy. Does not bruise/bleed easily.   Psychiatric/Behavioral: Positive for sleep disturbance. Negative for agitation, confusion and dysphoric mood. The patient is not nervous/anxious.        Vitals:    01/26/23 1600   BP: 132/94   Pulse: 84   Temp: 97.3 °F (36.3 °C)   SpO2: 98%       Objective   /94   Pulse 84   Temp 97.3 °F (36.3 °C)   Ht 182.9 cm (72\")   Wt 104 kg (229 lb)   SpO2 98%   BMI 31.06 kg/m²     Physical Exam  Constitutional:       General: He is not in acute distress.     Appearance: He is well-developed.   HENT:      Nose: Nose normal.   Eyes:      General: No scleral icterus.     Conjunctiva/sclera: Conjunctivae normal.   Neck:      Thyroid: No thyromegaly.      Trachea: No tracheal deviation.   Cardiovascular:      Rate and Rhythm: Normal rate and regular rhythm.      Heart sounds: No murmur heard.    No friction rub.   Pulmonary:      Effort: No respiratory distress.      Breath sounds: No wheezing or rales.   Abdominal:      General: There is no distension.      Palpations: Abdomen is soft. There is no mass.      Tenderness: There is no abdominal tenderness. There is no guarding.   Musculoskeletal:         General: Deformity present. Normal range of motion.   Lymphadenopathy:      Cervical: No cervical adenopathy.   Skin:     " General: Skin is warm and dry.      Findings: No erythema or rash.   Neurological:      Mental Status: He is alert and oriented to person, place, and time.      Cranial Nerves: No cranial nerve deficit.      Coordination: Coordination normal.      Deep Tendon Reflexes: Reflexes are normal and symmetric.   Psychiatric:         Behavior: Behavior normal.         Thought Content: Thought content normal.         Judgment: Judgment normal.         The 10-year CVD risk score (ERIC'Sayda et al., 2008) is: 14.4%    Values used to calculate the score:      Age: 52 years      Sex: Male      Diabetic: No      Tobacco smoker: No      Systolic Blood Pressure: 132 mmHg      Is BP treated: Yes      HDL Cholesterol: 32 mg/dL      Total Cholesterol: 144 mg/dL    Lab Results   Component Value Date    CHOL 144 11/02/2020    TRIG 146 11/02/2020    HDL 32 (L) 11/02/2020    LDL 86 11/02/2020     Glucose   Date Value Ref Range Status   01/19/2023 125 (H) 65 - 99 mg/dL Final     BUN   Date Value Ref Range Status   01/19/2023 18 6 - 20 mg/dL Final     Creatinine   Date Value Ref Range Status   01/19/2023 0.91 0.76 - 1.27 mg/dL Final     Sodium   Date Value Ref Range Status   01/19/2023 137 136 - 145 mmol/L Final     Potassium   Date Value Ref Range Status   01/19/2023 3.7 3.5 - 5.2 mmol/L Final     Comment:     Slight hemolysis detected by analyzer. Results may be affected.     Chloride   Date Value Ref Range Status   01/19/2023 100 98 - 107 mmol/L Final     CO2   Date Value Ref Range Status   01/19/2023 26.2 22.0 - 29.0 mmol/L Final     Calcium   Date Value Ref Range Status   01/19/2023 9.6 8.6 - 10.5 mg/dL Final     Total Protein   Date Value Ref Range Status   01/19/2023 7.3 6.0 - 8.5 g/dL Final     Albumin   Date Value Ref Range Status   01/19/2023 4.2 3.5 - 5.2 g/dL Final     ALT (SGPT)   Date Value Ref Range Status   01/19/2023 40 1 - 41 U/L Final     AST (SGOT)   Date Value Ref Range Status   01/19/2023 30 1 - 40 U/L Final      Alkaline Phosphatase   Date Value Ref Range Status   01/19/2023 68 39 - 117 U/L Final     Total Bilirubin   Date Value Ref Range Status   01/19/2023 1.3 (H) 0.0 - 1.2 mg/dL Final     eGFR Non  Amer   Date Value Ref Range Status   11/02/2020 78 >60 mL/min/1.73 Final     BUN/Creatinine Ratio   Date Value Ref Range Status   01/19/2023 19.8 7.0 - 25.0 Final     Anion Gap   Date Value Ref Range Status   01/19/2023 10.8 5.0 - 15.0 mmol/L Final     Lab Results   Component Value Date    WBC 6.11 01/19/2023    HGB 16.5 01/19/2023    HCT 46.4 01/19/2023    MCV 93.5 01/19/2023     01/19/2023       Assessment & Plan   1. Healthy male exam.   2. Patient Counseling: Including but not Limited to the following, when appropriate:  --Nutrition: Stressed importance of moderation in sodium/caffeine intake, saturated fat and cholesterol, caloric balance, sufficient intake of fresh fruits, vegetables, fiber    --Exercise: Stressed the importance of regular exercise.   --Substance Abuse: Discussed cessation/primary prevention of tobacco, alcohol, or other drug use; driving or other dangerous activities under the influence; availability of treatment for abuse, as indicated based on social history.    --Sexuality: Discussed sexually transmitted diseases, partner selection, use of condoms and contraceptive alternatives.   --Injury prevention: Discussed safety belts, safety helmets, smoke detector, smoking near bedding or upholstery.   --Dental health: Discussed importance of regular tooth brushing, flossing, and dental visits.  --Immunizations reviewed.  --Discussed benefits of colon cancer screening.      3. Discussed the patient's BMI with him.  The BMI is above average; BMI management plan is completed  4. No follow-ups on file.  5. Age-appropriate Screening Scheduled  6. There are no Patient Instructions on file for this visit.    Assessment & Plan     Diagnoses and all orders for this visit:    1. Routine general  medical examination at a health care facility (Primary)    Hypertension needs better control is on losartan and HCTZ recently placed on amlodipine discussed weight loss follow BP readings at home he is to report his readings to me in about 2 weeks.  Strongly suspect sleep apnea he is reluctant to get a sleep study done for now he thinks his weight loss will help him.  Advised follow-up in 2 months.  Refills on amlodipine given

## 2024-01-09 RX ORDER — AMLODIPINE BESYLATE 2.5 MG/1
2.5 TABLET ORAL DAILY
Qty: 30 TABLET | Refills: 0 | Status: SHIPPED | OUTPATIENT
Start: 2024-01-09

## 2024-01-09 RX ORDER — LOSARTAN POTASSIUM 100 MG/1
100 TABLET ORAL DAILY
Qty: 30 TABLET | Refills: 0 | Status: SHIPPED | OUTPATIENT
Start: 2024-01-09

## 2024-01-09 RX ORDER — HYDROCHLOROTHIAZIDE 25 MG/1
25 TABLET ORAL DAILY
Qty: 30 TABLET | Refills: 0 | Status: SHIPPED | OUTPATIENT
Start: 2024-01-09

## 2024-01-09 NOTE — TELEPHONE ENCOUNTER
S/w patient and let him know he is due for an annual physical. He states he will call back to schedule. I have sent in 30 day supply of medication.

## 2024-01-30 ENCOUNTER — OFFICE VISIT (OUTPATIENT)
Age: 54
End: 2024-01-30
Payer: COMMERCIAL

## 2024-01-30 VITALS
TEMPERATURE: 97.9 F | DIASTOLIC BLOOD PRESSURE: 82 MMHG | SYSTOLIC BLOOD PRESSURE: 136 MMHG | HEIGHT: 72 IN | WEIGHT: 232.3 LBS | OXYGEN SATURATION: 96 % | HEART RATE: 99 BPM | BODY MASS INDEX: 31.46 KG/M2

## 2024-01-30 DIAGNOSIS — Z12.5 PROSTATE CANCER SCREENING: ICD-10-CM

## 2024-01-30 DIAGNOSIS — Z12.11 COLON CANCER SCREENING: ICD-10-CM

## 2024-01-30 DIAGNOSIS — I10 PRIMARY HYPERTENSION: Primary | ICD-10-CM

## 2024-01-30 DIAGNOSIS — R73.9 ELEVATED BLOOD SUGAR: ICD-10-CM

## 2024-01-30 RX ORDER — LOSARTAN POTASSIUM 100 MG/1
100 TABLET ORAL DAILY
Qty: 30 TABLET | Refills: 3 | Status: SHIPPED | OUTPATIENT
Start: 2024-01-30

## 2024-01-30 RX ORDER — AMLODIPINE BESYLATE 2.5 MG/1
2.5 TABLET ORAL DAILY
Qty: 30 TABLET | Refills: 3 | Status: SHIPPED | OUTPATIENT
Start: 2024-01-30

## 2024-01-30 RX ORDER — HYDROCHLOROTHIAZIDE 25 MG/1
25 TABLET ORAL DAILY
Qty: 30 TABLET | Refills: 3 | Status: SHIPPED | OUTPATIENT
Start: 2024-01-30

## 2024-01-30 NOTE — PROGRESS NOTES
New Patient Office Visit      Date: 2024  Patient Name: Cristian Desai  : 1970   MRN: [id number]     Chief Complaint:    Chief Complaint   Patient presents with    Establish Care     Pt says they are needing blood pressure pills refilled.  Pt states he cannot hear good. Says it has been going on for years and years.       History of Present Illness: Cristian Desai is a 53 y.o. male who is here today for  establishment of care.  Patient states that he has a history of  HTN, which he feels is better controlled on his current medication regimen.    Subjective      Review of Systems:   Review of Systems   Constitutional:  Negative for appetite change and unexpected weight loss.   HENT:  Negative for trouble swallowing.    Eyes:  Negative for blurred vision and double vision.   Respiratory:  Negative for cough and shortness of breath.    Cardiovascular:  Negative for chest pain and leg swelling.   Gastrointestinal:  Negative for blood in stool.   Endocrine: Negative for cold intolerance, heat intolerance and polyuria.   Musculoskeletal:  Negative for joint swelling.   Skin:  Negative for color change and bruise.   Neurological:  Negative for numbness and memory problem.   Hematological:  Does not bruise/bleed easily.   Psychiatric/Behavioral:  Negative for suicidal ideas and depressed mood. The patient is not nervous/anxious.        Past Medical History:   Past Medical History:   Diagnosis Date    Arthritis     Gout     Hypertension        Past Surgical History:   Past Surgical History:   Procedure Laterality Date    HERNIA REPAIR Left        Family History:   Family History   Problem Relation Age of Onset    Cancer Maternal Grandmother     Cancer Maternal Grandfather     Hypertension Mother     Stroke Mother     Cancer Other     Diabetes Other     Hypertension Other     Stroke Other        Social History:   Social History     Socioeconomic History    Marital status:    Tobacco Use    Smoking  "status: Never    Smokeless tobacco: Never   Vaping Use    Vaping Use: Never used   Substance and Sexual Activity    Alcohol use: Yes     Alcohol/week: 2.0 standard drinks of alcohol     Types: 2 Cans of beer per week     Comment: occasionally    Drug use: Never    Sexual activity: Yes     Partners: Female       Medications:     Current Outpatient Medications:     amLODIPine (NORVASC) 2.5 MG tablet, Take 1 tablet by mouth Daily. Patient needs an appointment prior to further refills., Disp: 30 tablet, Rfl: 3    hydroCHLOROthiazide (HYDRODIURIL) 25 MG tablet, Take 1 tablet by mouth Daily. Patient needs an appointment prior to further refills., Disp: 30 tablet, Rfl: 3    losartan (COZAAR) 100 MG tablet, Take 1 tablet by mouth Daily. Patient needs an appointment prior to further refills., Disp: 30 tablet, Rfl: 3    Allergies:   No Known Allergies    Objective     Physical Exam:  Vital Signs:   Vitals:    01/30/24 1022   BP: 136/82   Pulse: 99   Temp: 97.9 °F (36.6 °C)   SpO2: 96%   Weight: 105 kg (232 lb 4.8 oz)   Height: 182.9 cm (72\")     Body mass index is 31.51 kg/m².      Physical Exam  Vitals and nursing note reviewed.   Constitutional:       Appearance: Normal appearance.   HENT:      Head: Normocephalic and atraumatic.   Eyes:      General: Lids are normal.      Conjunctiva/sclera: Conjunctivae normal.   Cardiovascular:      Rate and Rhythm: Normal rate and regular rhythm.   Pulmonary:      Effort: Pulmonary effort is normal.      Breath sounds: Normal breath sounds and air entry.   Abdominal:      General: Abdomen is flat. Bowel sounds are normal.      Palpations: Abdomen is soft.   Musculoskeletal:      Cervical back: Full passive range of motion without pain and normal range of motion.   Neurological:      General: No focal deficit present.      Mental Status: He is alert and oriented to person, place, and time.   Psychiatric:         Attention and Perception: Attention normal.         Mood and Affect: Mood " normal.         Behavior: Behavior normal. Behavior is cooperative.         POCT Results (if applicable):   Results for orders placed or performed during the hospital encounter of 01/19/23   Comprehensive Metabolic Panel    Specimen: Blood   Result Value Ref Range    Glucose 125 (H) 65 - 99 mg/dL    BUN 18 6 - 20 mg/dL    Creatinine 0.91 0.76 - 1.27 mg/dL    Sodium 137 136 - 145 mmol/L    Potassium 3.7 3.5 - 5.2 mmol/L    Chloride 100 98 - 107 mmol/L    CO2 26.2 22.0 - 29.0 mmol/L    Calcium 9.6 8.6 - 10.5 mg/dL    Total Protein 7.3 6.0 - 8.5 g/dL    Albumin 4.2 3.5 - 5.2 g/dL    ALT (SGPT) 40 1 - 41 U/L    AST (SGOT) 30 1 - 40 U/L    Alkaline Phosphatase 68 39 - 117 U/L    Total Bilirubin 1.3 (H) 0.0 - 1.2 mg/dL    Globulin 3.1 gm/dL    A/G Ratio 1.4 g/dL    BUN/Creatinine Ratio 19.8 7.0 - 25.0    Anion Gap 10.8 5.0 - 15.0 mmol/L    eGFR 100.8 >60.0 mL/min/1.73   Lipase    Specimen: Blood   Result Value Ref Range    Lipase 39 13 - 60 U/L   BNP    Specimen: Blood   Result Value Ref Range    proBNP <5.0 0.0 - 900.0 pg/mL   Troponin    Specimen: Blood   Result Value Ref Range    Troponin T <0.010 0.000 - 0.030 ng/mL   CBC Auto Differential    Specimen: Blood   Result Value Ref Range    WBC 6.11 3.40 - 10.80 10*3/mm3    RBC 4.96 4.14 - 5.80 10*6/mm3    Hemoglobin 16.5 13.0 - 17.7 g/dL    Hematocrit 46.4 37.5 - 51.0 %    MCV 93.5 79.0 - 97.0 fL    MCH 33.3 (H) 26.6 - 33.0 pg    MCHC 35.6 31.5 - 35.7 g/dL    RDW 12.8 12.3 - 15.4 %    RDW-SD 43.8 37.0 - 54.0 fl    MPV 10.2 6.0 - 12.0 fL    Platelets 171 140 - 450 10*3/mm3    Neutrophil % 61.3 42.7 - 76.0 %    Lymphocyte % 27.3 19.6 - 45.3 %    Monocyte % 8.0 5.0 - 12.0 %    Eosinophil % 2.3 0.3 - 6.2 %    Basophil % 0.8 0.0 - 1.5 %    Immature Grans % 0.3 0.0 - 0.5 %    Neutrophils, Absolute 3.74 1.70 - 7.00 10*3/mm3    Lymphocytes, Absolute 1.67 0.70 - 3.10 10*3/mm3    Monocytes, Absolute 0.49 0.10 - 0.90 10*3/mm3    Eosinophils, Absolute 0.14 0.00 - 0.40 10*3/mm3     Basophils, Absolute 0.05 0.00 - 0.20 10*3/mm3    Immature Grans, Absolute 0.02 0.00 - 0.05 10*3/mm3    nRBC 0.0 0.0 - 0.2 /100 WBC   D-dimer, Quantitative    Specimen: Blood   Result Value Ref Range    D-Dimer, Quantitative 0.37 0.00 - 0.53 MCGFEU/mL   Green Top (Gel)   Result Value Ref Range    Extra Tube Hold for add-ons.    Lavender Top   Result Value Ref Range    Extra Tube hold for add-on    Light Blue Top   Result Value Ref Range    Extra Tube Hold for add-ons.             Assessment / Plan      Assessment/Plan:   Diagnoses and all orders for this visit:    1. Primary hypertension (Primary)  -     amLODIPine (NORVASC) 2.5 MG tablet; Take 1 tablet by mouth Daily. Patient needs an appointment prior to further refills.  Dispense: 30 tablet; Refill: 3  -     hydroCHLOROthiazide (HYDRODIURIL) 25 MG tablet; Take 1 tablet by mouth Daily. Patient needs an appointment prior to further refills.  Dispense: 30 tablet; Refill: 3  -     losartan (COZAAR) 100 MG tablet; Take 1 tablet by mouth Daily. Patient needs an appointment prior to further refills.  Dispense: 30 tablet; Refill: 3  -     CBC (No Diff); Future  -     Comprehensive metabolic panel; Future  -     Lipid panel; Future    2. Elevated blood sugar  -     Hemoglobin A1c; Future    3. Prostate cancer screening  -     PSA Screen; Future    4. Colon cancer screening  -     Cologuard - Stool, Per Rectum; Future       BMI is >= 30 and <35. (Class 1 Obesity). The following options were offered after discussion;: exercise counseling/recommendations      Follow Up:   Return in about 6 months (around 7/30/2024) for Annual physical.    Isaias Baires, DO   [facility]

## 2024-02-05 ENCOUNTER — PATIENT ROUNDING (BHMG ONLY) (OUTPATIENT)
Age: 54
End: 2024-02-05
Payer: COMMERCIAL

## 2024-02-05 NOTE — PROGRESS NOTES
A [employer] message has been sent to the patient for PATIENT ROUNDING with [facility].

## 2024-02-07 ENCOUNTER — LAB (OUTPATIENT)
Dept: LAB | Facility: HOSPITAL | Age: 54
End: 2024-02-07
Payer: COMMERCIAL

## 2024-02-07 DIAGNOSIS — R73.9 ELEVATED BLOOD SUGAR: ICD-10-CM

## 2024-02-07 DIAGNOSIS — Z12.5 PROSTATE CANCER SCREENING: ICD-10-CM

## 2024-02-07 DIAGNOSIS — I10 PRIMARY HYPERTENSION: ICD-10-CM

## 2024-02-07 LAB
ALBUMIN SERPL-MCNC: 4.4 G/DL (ref 3.5–5.2)
ALBUMIN/GLOB SERPL: 1.5 G/DL
ALP SERPL-CCNC: 71 U/L (ref 39–117)
ALT SERPL W P-5'-P-CCNC: 28 U/L (ref 1–41)
ANION GAP SERPL CALCULATED.3IONS-SCNC: 9 MMOL/L (ref 5–15)
AST SERPL-CCNC: 23 U/L (ref 1–40)
BILIRUB SERPL-MCNC: 0.8 MG/DL (ref 0–1.2)
BUN SERPL-MCNC: 15 MG/DL (ref 6–20)
BUN/CREAT SERPL: 16.5 (ref 7–25)
CALCIUM SPEC-SCNC: 9.5 MG/DL (ref 8.6–10.5)
CHLORIDE SERPL-SCNC: 101 MMOL/L (ref 98–107)
CHOLEST SERPL-MCNC: 161 MG/DL (ref 0–200)
CO2 SERPL-SCNC: 28 MMOL/L (ref 22–29)
CREAT SERPL-MCNC: 0.91 MG/DL (ref 0.76–1.27)
DEPRECATED RDW RBC AUTO: 43.9 FL (ref 37–54)
EGFRCR SERPLBLD CKD-EPI 2021: 100.2 ML/MIN/1.73
ERYTHROCYTE [DISTWIDTH] IN BLOOD BY AUTOMATED COUNT: 12.8 % (ref 12.3–15.4)
GLOBULIN UR ELPH-MCNC: 2.9 GM/DL
GLUCOSE SERPL-MCNC: 111 MG/DL (ref 65–99)
HBA1C MFR BLD: 5.9 % (ref 4.8–5.6)
HCT VFR BLD AUTO: 43 % (ref 37.5–51)
HDLC SERPL-MCNC: 37 MG/DL (ref 40–60)
HGB BLD-MCNC: 14.7 G/DL (ref 13–17.7)
LDLC SERPL CALC-MCNC: 91 MG/DL (ref 0–100)
LDLC/HDLC SERPL: 2.33 {RATIO}
MCH RBC QN AUTO: 32.2 PG (ref 26.6–33)
MCHC RBC AUTO-ENTMCNC: 34.2 G/DL (ref 31.5–35.7)
MCV RBC AUTO: 94.1 FL (ref 79–97)
PLATELET # BLD AUTO: 201 10*3/MM3 (ref 140–450)
PMV BLD AUTO: 10.4 FL (ref 6–12)
POTASSIUM SERPL-SCNC: 4.3 MMOL/L (ref 3.5–5.2)
PROT SERPL-MCNC: 7.3 G/DL (ref 6–8.5)
PSA SERPL-MCNC: 3.6 NG/ML (ref 0–4)
RBC # BLD AUTO: 4.57 10*6/MM3 (ref 4.14–5.8)
SODIUM SERPL-SCNC: 138 MMOL/L (ref 136–145)
TRIGL SERPL-MCNC: 189 MG/DL (ref 0–150)
VLDLC SERPL-MCNC: 33 MG/DL (ref 5–40)
WBC NRBC COR # BLD AUTO: 6.12 10*3/MM3 (ref 3.4–10.8)

## 2024-02-07 PROCEDURE — 36415 COLL VENOUS BLD VENIPUNCTURE: CPT

## 2024-02-07 PROCEDURE — 85027 COMPLETE CBC AUTOMATED: CPT

## 2024-02-07 PROCEDURE — G0103 PSA SCREENING: HCPCS

## 2024-02-07 PROCEDURE — 80061 LIPID PANEL: CPT

## 2024-02-07 PROCEDURE — 80053 COMPREHEN METABOLIC PANEL: CPT

## 2024-02-07 PROCEDURE — 83036 HEMOGLOBIN GLYCOSYLATED A1C: CPT

## 2024-07-25 DIAGNOSIS — I10 PRIMARY HYPERTENSION: ICD-10-CM

## 2024-07-25 RX ORDER — LOSARTAN POTASSIUM 100 MG/1
100 TABLET ORAL DAILY
Qty: 90 TABLET | Refills: 1 | Status: SHIPPED | OUTPATIENT
Start: 2024-07-25

## 2024-07-25 RX ORDER — HYDROCHLOROTHIAZIDE 25 MG/1
25 TABLET ORAL DAILY
Qty: 90 TABLET | Refills: 1 | Status: SHIPPED | OUTPATIENT
Start: 2024-07-25

## 2024-07-25 NOTE — TELEPHONE ENCOUNTER
Rx Refill Note  Requested Prescriptions     Pending Prescriptions Disp Refills    hydroCHLOROthiazide 25 MG tablet [Pharmacy Med Name: hydroCHLOROthiazide 25 MG Oral Tablet] 30 tablet 3     Sig: Take 1 tablet by mouth Daily. Patient needs an appointment prior to further refills.    losartan (COZAAR) 100 MG tablet [Pharmacy Med Name: Losartan Potassium 100 MG Oral Tablet (COZAAR)] 30 tablet 3     Sig: Take 1 tablet by mouth Daily. Patient needs an appointment prior to further refills.      Last office visit with prescribing clinician: 1/30/2024   Last telemedicine visit with prescribing clinician: Visit date not found   Next office visit with prescribing clinician: 7/30/2024     Stephanie Russell MA  07/25/24, 10:53 EDT

## 2024-07-27 DIAGNOSIS — I10 PRIMARY HYPERTENSION: ICD-10-CM

## 2024-07-29 RX ORDER — HYDROCHLOROTHIAZIDE 25 MG/1
25 TABLET ORAL DAILY
Qty: 30 TABLET | Refills: 3 | OUTPATIENT
Start: 2024-07-29

## 2024-07-29 RX ORDER — LOSARTAN POTASSIUM 100 MG/1
100 TABLET ORAL DAILY
Qty: 30 TABLET | Refills: 3 | OUTPATIENT
Start: 2024-07-29

## 2024-07-30 ENCOUNTER — OFFICE VISIT (OUTPATIENT)
Age: 54
End: 2024-07-30
Payer: COMMERCIAL

## 2024-07-30 VITALS
HEART RATE: 82 BPM | BODY MASS INDEX: 31.56 KG/M2 | TEMPERATURE: 98.3 F | SYSTOLIC BLOOD PRESSURE: 138 MMHG | HEIGHT: 72 IN | OXYGEN SATURATION: 95 % | DIASTOLIC BLOOD PRESSURE: 88 MMHG | WEIGHT: 233 LBS

## 2024-07-30 DIAGNOSIS — I10 PRIMARY HYPERTENSION: ICD-10-CM

## 2024-07-30 PROCEDURE — 99396 PREV VISIT EST AGE 40-64: CPT | Performed by: FAMILY MEDICINE

## 2024-07-30 RX ORDER — LOSARTAN POTASSIUM 100 MG/1
100 TABLET ORAL DAILY
Qty: 90 TABLET | Refills: 3 | Status: SHIPPED | OUTPATIENT
Start: 2024-07-30

## 2024-07-30 RX ORDER — AMLODIPINE BESYLATE 2.5 MG/1
2.5 TABLET ORAL DAILY
Qty: 90 TABLET | Refills: 3 | Status: SHIPPED | OUTPATIENT
Start: 2024-07-30

## 2024-07-30 RX ORDER — HYDROCHLOROTHIAZIDE 25 MG/1
25 TABLET ORAL DAILY
Qty: 90 TABLET | Refills: 3 | Status: SHIPPED | OUTPATIENT
Start: 2024-07-30

## 2024-07-30 NOTE — PROGRESS NOTES
Male Physical Note      Date: 2024   Patient Name: Cristian Desai  : 1970   MRN: [id number]     Chief Complaint:    Chief Complaint   Patient presents with    Annual Exam     Physical   Medication refills        History of Present Illness: Cristian Desai is a 53 y.o. male who is here today for their annual health maintenance and physical.  Patient states that he has been doing well over all.  Does occasional check his BP and feels that it is around the numbers that it is today.      Subjective      Review of Systems:   Review of Systems   Constitutional:  Negative for appetite change and unexpected weight loss.   HENT:  Negative for trouble swallowing.    Eyes:  Negative for blurred vision and double vision.   Respiratory:  Negative for cough and shortness of breath.    Cardiovascular:  Negative for chest pain and leg swelling.   Gastrointestinal:  Negative for blood in stool.   Endocrine: Negative for cold intolerance, heat intolerance and polyuria.   Musculoskeletal:  Negative for joint swelling.   Skin:  Negative for color change and bruise.   Neurological:  Negative for numbness and memory problem.   Hematological:  Does not bruise/bleed easily.   Psychiatric/Behavioral:  Negative for suicidal ideas and depressed mood. The patient is not nervous/anxious.        Past Medical History, Social History, Family History and Care Team were all reviewed with patient and updated as appropriate.     Medications:     Current Outpatient Medications:     amLODIPine (NORVASC) 2.5 MG tablet, Take 1 tablet by mouth Daily., Disp: 90 tablet, Rfl: 3    hydroCHLOROthiazide 25 MG tablet, Take 1 tablet by mouth Daily. Patient needs an appointment prior to further refills., Disp: 90 tablet, Rfl: 1    hydroCHLOROthiazide 25 MG tablet, Take 1 tablet by mouth Daily., Disp: 90 tablet, Rfl: 3    losartan (COZAAR) 100 MG tablet, Take 1 tablet by mouth Daily. Patient needs an appointment prior to further refills., Disp:  90 tablet, Rfl: 1    losartan (COZAAR) 100 MG tablet, Take 1 tablet by mouth Daily., Disp: 90 tablet, Rfl: 3    Allergies:   No Known Allergies    Immunizations:  Health Maintenance Summary            Ordered - COLORECTAL CANCER SCREENING (View Topic Details) Ordered on 1/31/2024      No completion, postpone, or frequency change history exists for this topic.              Overdue - HEPATITIS C SCREENING (Once) Never done      No completion, postpone, or frequency change history exists for this topic.              Overdue - ANNUAL PHYSICAL (Yearly) Overdue since 1/26/2024 01/26/2023  Registry Metric: Last Annual Physical              Postponed - COVID-19 Vaccine (3 - 2023-24 season) Postponed until 7/30/2025 07/30/2024  Postponed until 7/30/2025 by Stephanie Russell MA (Product Unavailable)    10/01/2021  Imm Admin: COVID-19 (PFIZER) Purple Cap Monovalent    09/07/2021  Imm Admin: COVID-19 (PFIZER) Purple Cap Monovalent              Postponed - TDAP/TD VACCINES (1 - Tdap) Postponed until 7/30/2025 07/30/2024  Postponed until 7/30/2025 by Stephanie Russell MA (Patient Refused - pt declined)              Postponed - ZOSTER VACCINE (1 of 2) Postponed until 7/30/2025 07/30/2024  Postponed until 7/30/2025 by Stephanie Russell MA (Product Unavailable)              INFLUENZA VACCINE (Yearly - August to March) Next due on 8/1/2024      10/02/2023  Imm Admin: Influenza, Unspecified              BMI FOLLOWUP (Yearly) Next due on 1/30/2025 01/30/2024  [employer]: WORKFLOW - QUALITY MEASUREMENT - DOCUMENTED WEIGHT FOLLOW-UP PLAN    01/26/2023  Smart[employer]: WORKFLOW - QUALITY MEASUREMENT - BMI FOLLOW UP CARE PLAN DOCUMENTED    09/23/2022  [employer]: BMI EDUCATION FOR OVERWEIGHT              LIPID PANEL (Yearly) Next due on 2/7/2025 02/07/2024  Lipid panel    11/02/2020  Lipid Panel              Pneumococcal Vaccine 0-64 (Series Information) Aged Out      No completion, postpone, or frequency change  "history exists for this topic.                    No orders of the defined types were placed in this encounter.      Colorectal Screening:   ordered   Last Completed Colonoscopy       This patient has no relevant Health Maintenance data.            Hep C (Age 18-79 once):  will order    PSA (Over age 50, C Level Recommendation):  up to date    A1c:   Hemoglobin A1C   Date Value Ref Range Status   02/07/2024 5.90 (H) 4.80 - 5.60 % Final     Lipid panel: No results found for: \"LABLIPI\"    The 10-year ASCVD risk score (Eddie MAYORGA, et al., 2019) is: 6.9%    Values used to calculate the score:      Age: 53 years      Sex: Male      Is Non- : No      Diabetic: No      Tobacco smoker: No      Systolic Blood Pressure: 138 mmHg      Is BP treated: Yes      HDL Cholesterol: 37 mg/dL      Total Cholesterol: 161 mg/dL      Ophthalmologist: up to date  Dentist: up to date    Tobacco Use: Low Risk  (7/30/2024)    Patient History     Smoking Tobacco Use: Never     Smokeless Tobacco Use: Never     Passive Exposure: Not on file       Social History     Substance and Sexual Activity   Alcohol Use Yes    Alcohol/week: 2.0 standard drinks of alcohol    Types: 2 Cans of beer per week    Comment: occasionally        Social History     Substance and Sexual Activity   Drug Use Never        Diet/Physical activity: did briefly discuss, continue to encourage physical activity.    Sexual health:  contraception used    PHQ-2 Depression Screening  PHQ-9 Total Score: 0           Objective     Physical Exam:  Vital Signs:   Vitals:    07/30/24 0944   BP: 138/88   Pulse: 82   Temp: 98.3 °F (36.8 °C)   SpO2: 95%   Weight: 106 kg (233 lb)   Height: 182.9 cm (72\")     Body mass index is 31.6 kg/m².     Physical Exam  Vitals and nursing note reviewed.   Constitutional:       Appearance: Normal appearance.   HENT:      Head: Normocephalic and atraumatic.   Eyes:      General: Lids are normal.      Conjunctiva/sclera: Conjunctivae " normal.   Cardiovascular:      Rate and Rhythm: Normal rate and regular rhythm.   Pulmonary:      Effort: Pulmonary effort is normal.      Breath sounds: Normal breath sounds and air entry.   Abdominal:      General: Abdomen is flat. Bowel sounds are normal.      Palpations: Abdomen is soft.   Musculoskeletal:      Cervical back: Full passive range of motion without pain and normal range of motion.   Neurological:      General: No focal deficit present.      Mental Status: He is alert and oriented to person, place, and time.   Psychiatric:         Attention and Perception: Attention normal.         Mood and Affect: Mood normal.         Behavior: Behavior normal. Behavior is cooperative.          POCT Results (if applicable):   Results for orders placed or performed in visit on 02/07/24   Lipid panel    Specimen: Blood   Result Value Ref Range    Total Cholesterol 161 0 - 200 mg/dL    Triglycerides 189 (H) 0 - 150 mg/dL    HDL Cholesterol 37 (L) 40 - 60 mg/dL    LDL Cholesterol  91 0 - 100 mg/dL    VLDL Cholesterol 33 5 - 40 mg/dL    LDL/HDL Ratio 2.33    CBC (No Diff)    Specimen: Blood   Result Value Ref Range    WBC 6.12 3.40 - 10.80 10*3/mm3    RBC 4.57 4.14 - 5.80 10*6/mm3    Hemoglobin 14.7 13.0 - 17.7 g/dL    Hematocrit 43.0 37.5 - 51.0 %    MCV 94.1 79.0 - 97.0 fL    MCH 32.2 26.6 - 33.0 pg    MCHC 34.2 31.5 - 35.7 g/dL    RDW 12.8 12.3 - 15.4 %    RDW-SD 43.9 37.0 - 54.0 fl    MPV 10.4 6.0 - 12.0 fL    Platelets 201 140 - 450 10*3/mm3   Comprehensive metabolic panel    Specimen: Blood   Result Value Ref Range    Glucose 111 (H) 65 - 99 mg/dL    BUN 15 6 - 20 mg/dL    Creatinine 0.91 0.76 - 1.27 mg/dL    Sodium 138 136 - 145 mmol/L    Potassium 4.3 3.5 - 5.2 mmol/L    Chloride 101 98 - 107 mmol/L    CO2 28.0 22.0 - 29.0 mmol/L    Calcium 9.5 8.6 - 10.5 mg/dL    Total Protein 7.3 6.0 - 8.5 g/dL    Albumin 4.4 3.5 - 5.2 g/dL    ALT (SGPT) 28 1 - 41 U/L    AST (SGOT) 23 1 - 40 U/L    Alkaline Phosphatase 71 39 -  117 U/L    Total Bilirubin 0.8 0.0 - 1.2 mg/dL    Globulin 2.9 gm/dL    A/G Ratio 1.5 g/dL    BUN/Creatinine Ratio 16.5 7.0 - 25.0    Anion Gap 9.0 5.0 - 15.0 mmol/L    eGFR 100.2 >60.0 mL/min/1.73   PSA Screen    Specimen: Blood   Result Value Ref Range    PSA 3.600 0.000 - 4.000 ng/mL   Hemoglobin A1c    Specimen: Blood   Result Value Ref Range    Hemoglobin A1C 5.90 (H) 4.80 - 5.60 %       Procedures    Assessment / Plan      Assessment/Plan:   Diagnoses and all orders for this visit:    1. Primary hypertension  -     amLODIPine (NORVASC) 2.5 MG tablet; Take 1 tablet by mouth Daily.  Dispense: 90 tablet; Refill: 3  -     hydroCHLOROthiazide 25 MG tablet; Take 1 tablet by mouth Daily.  Dispense: 90 tablet; Refill: 3  -     losartan (COZAAR) 100 MG tablet; Take 1 tablet by mouth Daily.  Dispense: 90 tablet; Refill: 3         Healthcare Maintenance:  Counseling provided based on age appropriate USPSTF guidelines.       Cristian Desai voices understanding and acceptance of this advice and will call back with any further questions or concerns. AVS with preventive healthcare tips printed for patient.     Vaccine Counseling:      Follow Up:   Return in about 6 months (around 1/30/2025) for Recheck.    Isaias Baires,   [facility] [employer] [facility]

## 2025-05-27 ENCOUNTER — OFFICE VISIT (OUTPATIENT)
Age: 55
End: 2025-05-27
Payer: COMMERCIAL

## 2025-05-27 VITALS
BODY MASS INDEX: 32.1 KG/M2 | HEIGHT: 72 IN | HEART RATE: 82 BPM | TEMPERATURE: 97.4 F | WEIGHT: 237 LBS | OXYGEN SATURATION: 96 % | SYSTOLIC BLOOD PRESSURE: 149 MMHG | DIASTOLIC BLOOD PRESSURE: 102 MMHG

## 2025-05-27 DIAGNOSIS — M79.671 RIGHT FOOT PAIN: ICD-10-CM

## 2025-05-27 DIAGNOSIS — I10 PRIMARY HYPERTENSION: Primary | ICD-10-CM

## 2025-05-27 DIAGNOSIS — M72.2 PLANTAR FASCIITIS OF RIGHT FOOT: ICD-10-CM

## 2025-05-27 PROCEDURE — 99214 OFFICE O/P EST MOD 30 MIN: CPT | Performed by: FAMILY MEDICINE

## 2025-05-27 RX ORDER — AMLODIPINE BESYLATE 5 MG/1
5 TABLET ORAL DAILY
Qty: 30 TABLET | Refills: 3 | Status: SHIPPED | OUTPATIENT
Start: 2025-05-27

## 2025-05-27 RX ORDER — PREDNISONE 20 MG/1
TABLET ORAL
Qty: 15 TABLET | Refills: 0 | Status: SHIPPED | OUTPATIENT
Start: 2025-05-27 | End: 2025-06-08

## 2025-05-27 RX ORDER — TRAMADOL HYDROCHLORIDE 50 MG/1
50 TABLET ORAL EVERY 6 HOURS PRN
Qty: 10 TABLET | Refills: 0 | Status: SHIPPED | OUTPATIENT
Start: 2025-05-27

## 2025-05-27 NOTE — PROGRESS NOTES
Follow Up Office Visit      Date: 2025   Patient Name: Cristian Desai  : 1970   MRN: [id number]     Chief Complaint:    Chief Complaint   Patient presents with    Foot Pain     Right foot pain       History of Present Illness: Cristian Desai is a 54 y.o. male who is here today for right foot pain.    Right foot pain been a problem for the last couple of months.  States that he has tried some inserts that have helped slightly.  Pain is the whole bottom of his heel with some numb feeling in his lower calf area.  Pain gets worse toward the end of the day and is sharp in nature.    HTN-denies any chest pain, dyspnea, shortness of breath.  Does feel that some of the pain is having his foot may be increasing his blood pressure level to some degree.    Subjective      Review of Systems:   Review of Systems   Constitutional:  Negative for appetite change and unexpected weight loss.   HENT:  Negative for trouble swallowing.    Eyes:  Negative for blurred vision and double vision.   Respiratory:  Negative for cough and shortness of breath.    Cardiovascular:  Negative for chest pain and leg swelling.   Gastrointestinal:  Negative for blood in stool.   Endocrine: Negative for cold intolerance, heat intolerance and polyuria.   Musculoskeletal:  Negative for joint swelling.   Skin:  Negative for color change and bruise.   Neurological:  Negative for numbness and memory problem.   Hematological:  Does not bruise/bleed easily.   Psychiatric/Behavioral:  Negative for suicidal ideas and depressed mood. The patient is not nervous/anxious.        I have reviewed the patients family history, social history, past medical history, past surgical history and have updated it as appropriate.     Medications:     Current Outpatient Medications:     hydroCHLOROthiazide 25 MG tablet, Take 1 tablet by mouth Daily., Disp: 90 tablet, Rfl: 3    losartan (COZAAR) 100 MG tablet, Take 1 tablet by mouth Daily., Disp: 90 tablet,  "Rfl: 3    amLODIPine (NORVASC) 5 MG tablet, Take 1 tablet by mouth Daily., Disp: 30 tablet, Rfl: 3    predniSONE (DELTASONE) 20 MG tablet, Take 2 tablets by mouth Daily for 3 days, THEN 1.5 tablets Daily for 3 days, THEN 1 tablet Daily for 3 days, THEN 0.5 tablets Daily for 3 days., Disp: 15 tablet, Rfl: 0    traMADol (Ultram) 50 MG tablet, Take 1 tablet by mouth Every 6 (Six) Hours As Needed for Moderate Pain., Disp: 10 tablet, Rfl: 0    Allergies:   No Known Allergies    Objective     Physical Exam: Please see above  Vital Signs:   Vitals:    05/27/25 0957 05/27/25 0959   BP: (!) 144/103 (!) 149/102   Pulse: 82    Temp: 97.4 °F (36.3 °C)    SpO2: 96%    Weight: 108 kg (237 lb)    Height: 182.9 cm (72\")      Body mass index is 32.14 kg/m².    Physical Exam  Vitals and nursing note reviewed.   Constitutional:       Appearance: Normal appearance.   HENT:      Head: Normocephalic and atraumatic.   Eyes:      General: Lids are normal.      Conjunctiva/sclera: Conjunctivae normal.   Cardiovascular:      Rate and Rhythm: Normal rate and regular rhythm.   Pulmonary:      Effort: Pulmonary effort is normal.      Breath sounds: Normal breath sounds and air entry.   Abdominal:      General: Abdomen is flat. Bowel sounds are normal.      Palpations: Abdomen is soft.   Musculoskeletal:      Cervical back: Full passive range of motion without pain and normal range of motion.        Feet:    Feet:      Comments: Tenderness to palpation along the entirety of the patient's right heel.  Neurological:      General: No focal deficit present.      Mental Status: He is alert and oriented to person, place, and time.   Psychiatric:         Attention and Perception: Attention normal.         Mood and Affect: Mood normal.         Behavior: Behavior normal. Behavior is cooperative.         Procedures    Results:   Labs:   Hemoglobin A1C   Date Value Ref Range Status   02/07/2024 5.90 (H) 4.80 - 5.60 % Final     TSH   Date Value Ref Range " Status   11/02/2020 2.320 0.270 - 4.200 uIU/mL Final        POCT Results (if applicable):   Results for orders placed or performed in visit on 02/07/24   Lipid panel    Collection Time: 02/07/24  6:21 AM    Specimen: Blood   Result Value Ref Range    Total Cholesterol 161 0 - 200 mg/dL    Triglycerides 189 (H) 0 - 150 mg/dL    HDL Cholesterol 37 (L) 40 - 60 mg/dL    LDL Cholesterol  91 0 - 100 mg/dL    VLDL Cholesterol 33 5 - 40 mg/dL    LDL/HDL Ratio 2.33    CBC (No Diff)    Collection Time: 02/07/24  6:21 AM    Specimen: Blood   Result Value Ref Range    WBC 6.12 3.40 - 10.80 10*3/mm3    RBC 4.57 4.14 - 5.80 10*6/mm3    Hemoglobin 14.7 13.0 - 17.7 g/dL    Hematocrit 43.0 37.5 - 51.0 %    MCV 94.1 79.0 - 97.0 fL    MCH 32.2 26.6 - 33.0 pg    MCHC 34.2 31.5 - 35.7 g/dL    RDW 12.8 12.3 - 15.4 %    RDW-SD 43.9 37.0 - 54.0 fl    MPV 10.4 6.0 - 12.0 fL    Platelets 201 140 - 450 10*3/mm3   Comprehensive metabolic panel    Collection Time: 02/07/24  6:21 AM    Specimen: Blood   Result Value Ref Range    Glucose 111 (H) 65 - 99 mg/dL    BUN 15 6 - 20 mg/dL    Creatinine 0.91 0.76 - 1.27 mg/dL    Sodium 138 136 - 145 mmol/L    Potassium 4.3 3.5 - 5.2 mmol/L    Chloride 101 98 - 107 mmol/L    CO2 28.0 22.0 - 29.0 mmol/L    Calcium 9.5 8.6 - 10.5 mg/dL    Total Protein 7.3 6.0 - 8.5 g/dL    Albumin 4.4 3.5 - 5.2 g/dL    ALT (SGPT) 28 1 - 41 U/L    AST (SGOT) 23 1 - 40 U/L    Alkaline Phosphatase 71 39 - 117 U/L    Total Bilirubin 0.8 0.0 - 1.2 mg/dL    Globulin 2.9 gm/dL    A/G Ratio 1.5 g/dL    BUN/Creatinine Ratio 16.5 7.0 - 25.0    Anion Gap 9.0 5.0 - 15.0 mmol/L    eGFR 100.2 >60.0 mL/min/1.73   PSA Screen    Collection Time: 02/07/24  6:21 AM    Specimen: Blood   Result Value Ref Range    PSA 3.600 0.000 - 4.000 ng/mL   Hemoglobin A1c    Collection Time: 02/07/24  6:21 AM    Specimen: Blood   Result Value Ref Range    Hemoglobin A1C 5.90 (H) 4.80 - 5.60 %       Imaging:   No valid procedures specified.       Assessment  / Plan      Assessment/Plan:   Diagnoses and all orders for this visit:    1. Primary hypertension (Primary)  -     amLODIPine (NORVASC) 5 MG tablet; Take 1 tablet by mouth Daily.  Dispense: 30 tablet; Refill: 3    2. Plantar fasciitis of right foot  -     predniSONE (DELTASONE) 20 MG tablet; Take 2 tablets by mouth Daily for 3 days, THEN 1.5 tablets Daily for 3 days, THEN 1 tablet Daily for 3 days, THEN 0.5 tablets Daily for 3 days.  Dispense: 15 tablet; Refill: 0    3. Right foot pain  -     XR Foot 3+ View Right; Future  -     traMADol (Ultram) 50 MG tablet; Take 1 tablet by mouth Every 6 (Six) Hours As Needed for Moderate Pain.  Dispense: 10 tablet; Refill: 0            Vaccine Counseling:      Follow Up:   Return in about 4 weeks (around 6/24/2025) for Annual physical.        Isaias Baires, DO  [facility]

## 2025-06-04 ENCOUNTER — TELEPHONE (OUTPATIENT)
Age: 55
End: 2025-06-04
Payer: COMMERCIAL

## 2025-06-04 DIAGNOSIS — I10 PRIMARY HYPERTENSION: ICD-10-CM

## 2025-06-04 RX ORDER — LOSARTAN POTASSIUM 100 MG/1
100 TABLET ORAL DAILY
Qty: 90 TABLET | Refills: 3 | Status: SHIPPED | OUTPATIENT
Start: 2025-06-04

## 2025-06-04 RX ORDER — HYDROCHLOROTHIAZIDE 25 MG/1
25 TABLET ORAL DAILY
Qty: 90 TABLET | Refills: 3 | Status: SHIPPED | OUTPATIENT
Start: 2025-06-04

## 2025-06-04 NOTE — TELEPHONE ENCOUNTER
"Caller: Cristian Desai \"RISHABH\"    Relationship: Self    Best call back number: [phone]    Requested Prescriptions:   Requested Prescriptions      No prescriptions requested or ordered in this encounter      hydroCHLOROthiazide 25 MG tablet     losartan (COZAAR) 100 MG tablet     Pharmacy where request should be sent: [facility]     Last office visit with prescribing clinician: 5/27/2025   Last telemedicine visit with prescribing clinician: Visit date not found   Next office visit with prescribing clinician: 6/23/2025     Does the patient have less than a 3 day supply:  [x] Yes  [] No    Would you like a call back once the refill request has been completed: [] Yes [x] No    If the office needs to give you a call back, can they leave a voicemail: [] Yes [x] No    Yelitza Schuler, PCT   06/04/25 09:34 EDT       "

## 2025-06-04 NOTE — TELEPHONE ENCOUNTER
Rx Refill Note  Requested Prescriptions     Pending Prescriptions Disp Refills    hydroCHLOROthiazide 25 MG tablet 90 tablet 3     Sig: Take 1 tablet by mouth Daily.    losartan (COZAAR) 100 MG tablet 90 tablet 3     Sig: Take 1 tablet by mouth Daily.      Last office visit with prescribing clinician: 5/27/2025   Last telemedicine visit with prescribing clinician: Visit date not found   Next office visit with prescribing clinician: 6/23/2025       Blanco Murphy MA  06/04/25, 10:17 EDT

## 2025-06-21 DIAGNOSIS — M79.673 PAIN OF FOOT, UNSPECIFIED LATERALITY: Primary | ICD-10-CM

## 2025-06-23 ENCOUNTER — OFFICE VISIT (OUTPATIENT)
Age: 55
End: 2025-06-23
Payer: COMMERCIAL

## 2025-06-23 VITALS
HEIGHT: 72 IN | HEART RATE: 90 BPM | DIASTOLIC BLOOD PRESSURE: 111 MMHG | OXYGEN SATURATION: 96 % | TEMPERATURE: 98.4 F | SYSTOLIC BLOOD PRESSURE: 167 MMHG | BODY MASS INDEX: 32.1 KG/M2 | WEIGHT: 237 LBS

## 2025-06-23 DIAGNOSIS — Z11.59 ENCOUNTER FOR HEPATITIS C SCREENING TEST FOR LOW RISK PATIENT: ICD-10-CM

## 2025-06-23 DIAGNOSIS — Z12.11 COLON CANCER SCREENING: Primary | ICD-10-CM

## 2025-06-23 DIAGNOSIS — I10 PRIMARY HYPERTENSION: ICD-10-CM

## 2025-06-23 DIAGNOSIS — R73.09 ELEVATED GLUCOSE: ICD-10-CM

## 2025-06-23 DIAGNOSIS — Z00.01 ENCOUNTER FOR GENERAL ADULT MEDICAL EXAMINATION WITH ABNORMAL FINDINGS: ICD-10-CM

## 2025-06-23 DIAGNOSIS — Z12.5 PROSTATE CANCER SCREENING: ICD-10-CM

## 2025-06-23 LAB
ALBUMIN SERPL-MCNC: 3.9 G/DL (ref 3.5–5.2)
ALBUMIN/GLOB SERPL: 1.3 G/DL
ALP SERPL-CCNC: 67 U/L (ref 39–117)
ALT SERPL W P-5'-P-CCNC: 27 U/L (ref 1–41)
ANION GAP SERPL CALCULATED.3IONS-SCNC: 11.7 MMOL/L (ref 5–15)
AST SERPL-CCNC: 22 U/L (ref 1–40)
BILIRUB SERPL-MCNC: 0.4 MG/DL (ref 0–1.2)
BUN SERPL-MCNC: 12 MG/DL (ref 6–20)
BUN/CREAT SERPL: 12.4 (ref 7–25)
CALCIUM SPEC-SCNC: 9.1 MG/DL (ref 8.6–10.5)
CHLORIDE SERPL-SCNC: 103 MMOL/L (ref 98–107)
CHOLEST SERPL-MCNC: 139 MG/DL (ref 0–200)
CO2 SERPL-SCNC: 25.3 MMOL/L (ref 22–29)
CREAT SERPL-MCNC: 0.97 MG/DL (ref 0.76–1.27)
DEPRECATED RDW RBC AUTO: 44.1 FL (ref 37–54)
EGFRCR SERPLBLD CKD-EPI 2021: 92.2 ML/MIN/1.73
ERYTHROCYTE [DISTWIDTH] IN BLOOD BY AUTOMATED COUNT: 12.7 % (ref 12.3–15.4)
GLOBULIN UR ELPH-MCNC: 3 GM/DL
GLUCOSE SERPL-MCNC: 110 MG/DL (ref 65–99)
HBA1C MFR BLD: 6 % (ref 4.8–5.6)
HCT VFR BLD AUTO: 39.7 % (ref 37.5–51)
HCV AB SER QL: NORMAL
HDLC SERPL-MCNC: 34 MG/DL (ref 40–60)
HGB BLD-MCNC: 13.6 G/DL (ref 13–17.7)
LDLC SERPL CALC-MCNC: 85 MG/DL (ref 0–100)
LDLC/HDLC SERPL: 2.45 {RATIO}
MCH RBC QN AUTO: 33.3 PG (ref 26.6–33)
MCHC RBC AUTO-ENTMCNC: 34.3 G/DL (ref 31.5–35.7)
MCV RBC AUTO: 97.1 FL (ref 79–97)
PLATELET # BLD AUTO: 199 10*3/MM3 (ref 140–450)
PMV BLD AUTO: 10.4 FL (ref 6–12)
POTASSIUM SERPL-SCNC: 4.3 MMOL/L (ref 3.5–5.2)
PROT SERPL-MCNC: 6.9 G/DL (ref 6–8.5)
PSA SERPL-MCNC: 4.99 NG/ML (ref 0–4)
RBC # BLD AUTO: 4.09 10*6/MM3 (ref 4.14–5.8)
SODIUM SERPL-SCNC: 140 MMOL/L (ref 136–145)
TRIGL SERPL-MCNC: 109 MG/DL (ref 0–150)
VLDLC SERPL-MCNC: 20 MG/DL (ref 5–40)
WBC NRBC COR # BLD AUTO: 5.07 10*3/MM3 (ref 3.4–10.8)

## 2025-06-23 PROCEDURE — 86803 HEPATITIS C AB TEST: CPT | Performed by: FAMILY MEDICINE

## 2025-06-23 PROCEDURE — 83036 HEMOGLOBIN GLYCOSYLATED A1C: CPT | Performed by: FAMILY MEDICINE

## 2025-06-23 PROCEDURE — G0103 PSA SCREENING: HCPCS | Performed by: FAMILY MEDICINE

## 2025-06-23 PROCEDURE — 85027 COMPLETE CBC AUTOMATED: CPT | Performed by: FAMILY MEDICINE

## 2025-06-23 PROCEDURE — 80053 COMPREHEN METABOLIC PANEL: CPT | Performed by: FAMILY MEDICINE

## 2025-06-23 PROCEDURE — 99213 OFFICE O/P EST LOW 20 MIN: CPT | Performed by: FAMILY MEDICINE

## 2025-06-23 PROCEDURE — 99396 PREV VISIT EST AGE 40-64: CPT | Performed by: FAMILY MEDICINE

## 2025-06-23 PROCEDURE — 80061 LIPID PANEL: CPT | Performed by: FAMILY MEDICINE

## 2025-06-23 RX ORDER — AMLODIPINE BESYLATE 10 MG/1
10 TABLET ORAL DAILY
Qty: 90 TABLET | Refills: 3 | Status: SHIPPED | OUTPATIENT
Start: 2025-06-23

## 2025-06-23 NOTE — PROGRESS NOTES
Male Physical Note      Date: 2025   Patient Name: Cristian Desai  : 1970   MRN: [id number]     Chief Complaint:    Chief Complaint   Patient presents with    Annual Exam     Physical          History of Present Illness: Cristian Desai is a 54 y.o. male who is here today for their annual health maintenance and physical. States that his right foot still hurts at the heel.  Steroids helped for about a day.  Does state that he has not been checking his blood pressure either.  Denies any headache, chest pain, dyspnea.      Subjective      Review of Systems:   Review of Systems   Constitutional:  Negative for appetite change and unexpected weight loss.   HENT:  Negative for trouble swallowing.    Eyes:  Negative for blurred vision and double vision.   Respiratory:  Negative for cough and shortness of breath.    Cardiovascular:  Negative for chest pain and leg swelling.   Gastrointestinal:  Negative for blood in stool.   Endocrine: Negative for cold intolerance, heat intolerance and polyuria.   Musculoskeletal:  Negative for joint swelling.   Skin:  Negative for color change and bruise.   Neurological:  Negative for numbness and memory problem.   Hematological:  Does not bruise/bleed easily.   Psychiatric/Behavioral:  Negative for suicidal ideas and depressed mood. The patient is not nervous/anxious.        Past Medical History, Social History, Family History and Care Team were all reviewed with patient and updated as appropriate.     Medications:     Current Outpatient Medications:     hydroCHLOROthiazide 25 MG tablet, Take 1 tablet by mouth Daily., Disp: 90 tablet, Rfl: 3    losartan (COZAAR) 100 MG tablet, Take 1 tablet by mouth Daily., Disp: 90 tablet, Rfl: 3    traMADol (Ultram) 50 MG tablet, Take 1 tablet by mouth Every 6 (Six) Hours As Needed for Moderate Pain., Disp: 10 tablet, Rfl: 0    amLODIPine (NORVASC) 10 MG tablet, Take 1 tablet by mouth Daily., Disp: 90 tablet, Rfl: 3    Allergies:    No Known Allergies    Immunizations:  Health Maintenance Summary            Current Care Gaps       ANNUAL PHYSICAL (Yearly) Overdue since 1/26/2024 01/26/2023  Registry Metric: Last Annual Physical              COVID-19 Vaccine (3 - 2024-25 season) Overdue since 9/1/2024 07/30/2024  Postponed until 7/30/2025 by Stephanie Russell MA (Product Unavailable)    10/01/2021  Imm Admin: COVID-19 (PFIZER) Purple Cap Monovalent    09/07/2021  Imm Admin: COVID-19 (PFIZER) Purple Cap Monovalent              Pneumococcal Vaccine 50+ (1 of 1 - PCV) Never done     No completion, postpone, or frequency change history exists for this topic.                      Awaiting Completion       COLORECTAL CANCER SCREENING (View Topic Details) Order placed this encounter      06/23/2025  Order placed for Cologuard - Stool, Per Rectum by Isaias Baires DO              HEPATITIS C SCREENING (Once) Order placed this encounter      06/23/2025  Order placed for Hepatitis C antibody by Isaias Baires DO              LIPID PANEL (Yearly) Order placed this encounter      06/23/2025  Order placed for Lipid panel by Isaias Baires DO    02/07/2024  Lipid panel    11/02/2020  Lipid Panel                      Upcoming       TDAP/TD VACCINES (1 - Tdap) Postponed until 7/30/2025 07/30/2024  Postponed until 7/30/2025 by Stephanie Russell MA (Patient Refused - pt declined)              ZOSTER VACCINE (1 of 2) Postponed until 7/30/2025 07/30/2024  Postponed until 7/30/2025 by Stephanie Russell MA (Product Unavailable)              INFLUENZA VACCINE (Yearly - July to March) Next due on 7/1/2025      10/24/2024  Outside Immunization: Influenza, High Dose    10/02/2023  Imm Admin: Influenza, Unspecified                            No orders of the defined types were placed in this encounter.      Colorectal Screening:   Ordered  Last Completed Colonoscopy    This patient has no relevant Health Maintenance data.         Hep C (Age  "18-79 once): Ordered    PSA (Over age 50, C Level Recommendation): Ordered  A1c:   Hemoglobin A1C   Date Value Ref Range Status   02/07/2024 5.90 (H) 4.80 - 5.60 % Final     Lipid panel: No results found for: \"LABLIPI\"    The 10-year ASCVD risk score (Eddie MAYORGA, et al., 2019) is: 10.5%    Values used to calculate the score:      Age: 54 years      Sex: Male      Is Non- : No      Diabetic: No      Tobacco smoker: No      Systolic Blood Pressure: 167 mmHg      Is BP treated: Yes      HDL Cholesterol: 37 mg/dL      Total Cholesterol: 161 mg/dL      Ophthalmologist: Recommended  Dentist: Recommended    Tobacco Use: Low Risk  (6/23/2025)    Patient History     Smoking Tobacco Use: Never     Smokeless Tobacco Use: Never     Passive Exposure: Not on file       Social History     Substance and Sexual Activity   Alcohol Use Yes    Alcohol/week: 2.0 standard drinks of alcohol    Types: 2 Cans of beer per week    Comment: occasionally        Social History     Substance and Sexual Activity   Drug Use Never               Objective     Physical Exam:  Vital Signs:   Vitals:    06/23/25 0906   BP: (!) 167/111   Pulse: 90   Temp: 98.4 °F (36.9 °C)   SpO2: 96%   Weight: 108 kg (237 lb)   Height: 182.9 cm (72\")     Body mass index is 32.14 kg/m².     Physical Exam  Vitals and nursing note reviewed.   Constitutional:       Appearance: Normal appearance.   HENT:      Head: Normocephalic and atraumatic.   Eyes:      General: Lids are normal.      Conjunctiva/sclera: Conjunctivae normal.   Cardiovascular:      Rate and Rhythm: Normal rate and regular rhythm.   Pulmonary:      Effort: Pulmonary effort is normal.      Breath sounds: Normal breath sounds and air entry.   Abdominal:      General: Abdomen is flat. Bowel sounds are normal.      Palpations: Abdomen is soft.   Musculoskeletal:      Cervical back: Full passive range of motion without pain and normal range of motion.   Neurological:      General: No focal " deficit present.      Mental Status: He is alert and oriented to person, place, and time.   Psychiatric:         Attention and Perception: Attention normal.         Mood and Affect: Mood normal.         Behavior: Behavior normal. Behavior is cooperative.          POCT Results (if applicable):   Results for orders placed or performed in visit on 02/07/24   Lipid panel    Collection Time: 02/07/24  6:21 AM    Specimen: Blood   Result Value Ref Range    Total Cholesterol 161 0 - 200 mg/dL    Triglycerides 189 (H) 0 - 150 mg/dL    HDL Cholesterol 37 (L) 40 - 60 mg/dL    LDL Cholesterol  91 0 - 100 mg/dL    VLDL Cholesterol 33 5 - 40 mg/dL    LDL/HDL Ratio 2.33    CBC (No Diff)    Collection Time: 02/07/24  6:21 AM    Specimen: Blood   Result Value Ref Range    WBC 6.12 3.40 - 10.80 10*3/mm3    RBC 4.57 4.14 - 5.80 10*6/mm3    Hemoglobin 14.7 13.0 - 17.7 g/dL    Hematocrit 43.0 37.5 - 51.0 %    MCV 94.1 79.0 - 97.0 fL    MCH 32.2 26.6 - 33.0 pg    MCHC 34.2 31.5 - 35.7 g/dL    RDW 12.8 12.3 - 15.4 %    RDW-SD 43.9 37.0 - 54.0 fl    MPV 10.4 6.0 - 12.0 fL    Platelets 201 140 - 450 10*3/mm3   Comprehensive metabolic panel    Collection Time: 02/07/24  6:21 AM    Specimen: Blood   Result Value Ref Range    Glucose 111 (H) 65 - 99 mg/dL    BUN 15 6 - 20 mg/dL    Creatinine 0.91 0.76 - 1.27 mg/dL    Sodium 138 136 - 145 mmol/L    Potassium 4.3 3.5 - 5.2 mmol/L    Chloride 101 98 - 107 mmol/L    CO2 28.0 22.0 - 29.0 mmol/L    Calcium 9.5 8.6 - 10.5 mg/dL    Total Protein 7.3 6.0 - 8.5 g/dL    Albumin 4.4 3.5 - 5.2 g/dL    ALT (SGPT) 28 1 - 41 U/L    AST (SGOT) 23 1 - 40 U/L    Alkaline Phosphatase 71 39 - 117 U/L    Total Bilirubin 0.8 0.0 - 1.2 mg/dL    Globulin 2.9 gm/dL    A/G Ratio 1.5 g/dL    BUN/Creatinine Ratio 16.5 7.0 - 25.0    Anion Gap 9.0 5.0 - 15.0 mmol/L    eGFR 100.2 >60.0 mL/min/1.73   PSA Screen    Collection Time: 02/07/24  6:21 AM    Specimen: Blood   Result Value Ref Range    PSA 3.600 0.000 - 4.000 ng/mL    Hemoglobin A1c    Collection Time: 02/07/24  6:21 AM    Specimen: Blood   Result Value Ref Range    Hemoglobin A1C 5.90 (H) 4.80 - 5.60 %       Procedures    Assessment / Plan      Assessment/Plan:   Diagnoses and all orders for this visit:    1. Colon cancer screening (Primary)  -     Cologuard - Stool, Per Rectum; Future    2. Primary hypertension  -     amLODIPine (NORVASC) 10 MG tablet; Take 1 tablet by mouth Daily.  Dispense: 90 tablet; Refill: 3  -     CBC (No Diff); Future  -     Comprehensive metabolic panel; Future  -     Lipid panel; Future  -     CBC (No Diff)  -     Comprehensive metabolic panel  -     Lipid panel    3. Elevated glucose  -     Hemoglobin A1c; Future  -     Hemoglobin A1c    4. Encounter for hepatitis C screening test for low risk patient  -     Hepatitis C antibody; Future  -     Hepatitis C antibody    5. Prostate cancer screening  -     PSA Screen; Future  -     PSA Screen    6. Encounter for general adult medical examination with abnormal findings         Healthcare Maintenance:  Counseling provided based on age appropriate USPSTF guidelines.  BMI is >= 30 and <35. (Class 1 Obesity). The following options were offered after discussion;: exercise counseling/recommendations    Cristian Desai voices understanding and acceptance of this advice and will call back with any further questions or concerns. AVS with preventive healthcare tips printed for patient.     Vaccine Counseling:      Follow Up:   Return in about 6 weeks (around 8/4/2025) for HTN.          Isaias Baires,   [facility]

## 2025-07-02 DIAGNOSIS — M77.9 BONE SPUR: ICD-10-CM

## 2025-07-02 DIAGNOSIS — M79.671 RIGHT FOOT PAIN: Primary | ICD-10-CM

## 2025-07-09 ENCOUNTER — TELEPHONE (OUTPATIENT)
Age: 55
End: 2025-07-09

## 2025-07-16 ENCOUNTER — TELEPHONE (OUTPATIENT)
Age: 55
End: 2025-07-16

## 2025-08-18 ENCOUNTER — OFFICE VISIT (OUTPATIENT)
Age: 55
End: 2025-08-18
Payer: COMMERCIAL

## 2025-08-18 VITALS
BODY MASS INDEX: 31.29 KG/M2 | HEART RATE: 90 BPM | HEIGHT: 72 IN | SYSTOLIC BLOOD PRESSURE: 126 MMHG | TEMPERATURE: 98.3 F | OXYGEN SATURATION: 95 % | WEIGHT: 231 LBS | DIASTOLIC BLOOD PRESSURE: 90 MMHG

## 2025-08-18 DIAGNOSIS — M77.9 BONE SPUR: Primary | ICD-10-CM

## 2025-08-18 DIAGNOSIS — R97.20 ELEVATED PSA: ICD-10-CM

## 2025-08-18 DIAGNOSIS — I10 PRIMARY HYPERTENSION: ICD-10-CM

## 2025-08-18 LAB — PSA SERPL-MCNC: 4.92 NG/ML (ref 0–4)

## 2025-08-18 PROCEDURE — 99214 OFFICE O/P EST MOD 30 MIN: CPT | Performed by: FAMILY MEDICINE

## 2025-08-18 PROCEDURE — 84153 ASSAY OF PSA TOTAL: CPT | Performed by: FAMILY MEDICINE

## 2025-08-18 RX ORDER — MELOXICAM 7.5 MG/1
7.5 TABLET ORAL DAILY
Qty: 90 TABLET | Refills: 3 | Status: SHIPPED | OUTPATIENT
Start: 2025-08-18